# Patient Record
Sex: MALE | Race: WHITE | Employment: UNEMPLOYED | URBAN - METROPOLITAN AREA
[De-identification: names, ages, dates, MRNs, and addresses within clinical notes are randomized per-mention and may not be internally consistent; named-entity substitution may affect disease eponyms.]

---

## 2018-06-28 ENCOUNTER — OFFICE VISIT (OUTPATIENT)
Dept: FAMILY MEDICINE | Facility: CLINIC | Age: 63
End: 2018-06-28
Payer: COMMERCIAL

## 2018-06-28 VITALS
OXYGEN SATURATION: 96 % | TEMPERATURE: 98.2 F | DIASTOLIC BLOOD PRESSURE: 82 MMHG | HEART RATE: 72 BPM | SYSTOLIC BLOOD PRESSURE: 132 MMHG | WEIGHT: 243 LBS | HEIGHT: 70 IN | BODY MASS INDEX: 34.79 KG/M2

## 2018-06-28 DIAGNOSIS — E66.01 MORBID OBESITY (H): ICD-10-CM

## 2018-06-28 DIAGNOSIS — H35.62 RETINAL HEMORRHAGE, LEFT EYE: ICD-10-CM

## 2018-06-28 DIAGNOSIS — H10.022 PINK EYE DISEASE OF LEFT EYE: Primary | ICD-10-CM

## 2018-06-28 DIAGNOSIS — E78.5 HYPERLIPIDEMIA LDL GOAL <100: ICD-10-CM

## 2018-06-28 DIAGNOSIS — Z86.0100 HISTORY OF COLONIC POLYPS: ICD-10-CM

## 2018-06-28 DIAGNOSIS — I10 HYPERTENSION GOAL BP (BLOOD PRESSURE) < 130/80: ICD-10-CM

## 2018-06-28 PROCEDURE — 99203 OFFICE O/P NEW LOW 30 MIN: CPT | Performed by: FAMILY MEDICINE

## 2018-06-28 RX ORDER — GENTAMICIN SULFATE 3 MG/ML
1 SOLUTION/ DROPS OPHTHALMIC EVERY 4 HOURS
Qty: 5 ML | Refills: 0 | Status: SHIPPED | OUTPATIENT
Start: 2018-06-28 | End: 2018-07-05

## 2018-06-28 RX ORDER — ATORVASTATIN CALCIUM 20 MG/1
20 TABLET, FILM COATED ORAL DAILY
Refills: 0 | COMMUNITY
Start: 2018-06-05 | End: 2018-09-11

## 2018-06-28 NOTE — PROGRESS NOTES
"  SUBJECTIVE:                                                    Hector Chan is a 63 year old male who presents to clinic today for the following health issues:    Acute Illness   Acute illness concerns: Eye issue  Onset: 10 days ago thought he had something in left eye, 2 days ago redness developed    Fever: no    Chills/Sweats: no    Headache (location?): no    Sinus Pressure:no    Conjunctivitis:  YES- left eye red- no pain or itchy    Ear Pain: no    Rhinorrhea: no    Congestion: no    Sore Throat: no    Eyes have some increased mattering over the last 2 days     Cough: no    Wheeze: no    Decreased Appetite: no    Nausea: no    Vomiting: no    Diarrhea:  no    Dysuria/Freq.: no    Fatigue/Achiness: no    Sick/Strep Exposure: no       Therapies Tried and outcome: nothing- pt does have allergies for which he takes Zyrtec    Blurry vision- Blurry vision the last 2 weeks. Does not know if it has to do with the red eyes Pt did check BP and blood sugar and both were fine.    Hemorrhage in eye- Ophthalmologist noted a hemorrhage in left eye about 10 days ago that did cause some vision loss.     Problem list and histories reviewed & adjusted, as indicated.  Additional history: as documented    ROS:  Constitutional, HEENT, cardiovascular, pulmonary, GI, , musculoskeletal, neuro, skin, endocrine and psych systems are negative, except as otherwise noted.    This document serves as a record of the services and decisions personally performed and made by Shashank Marsh MD. It was created on her behalf by Manuela Garay, a trained medical scribe. The creation of this document is based the provider's statements to the medical scribe.  Clemente Garay 11:48 AM, June 28, 2018    OBJECTIVE:                                                    /82  Pulse 72  Temp 98.2  F (36.8  C) (Oral)  Ht 5' 9.5\" (1.765 m)  Wt 243 lb (110.2 kg)  SpO2 96%  BMI 35.37 kg/m2 Body mass index is 35.37 kg/(m^2).     GENERAL: " "healthy, alert, well nourished, well hydrated, no distress  EYES: Injection of conjunctiva and lateral part of left eye with slight mattering and minimal discharge, Eyes grossly normal to inspection, extraocular movements - intact, and PERRLNECK: no tenderness, no adenopathy, no asymmetry, no masses, no stiffness; thyroid- normal to palpation  RESP: lungs clear to auscultation - no rales, no rhonchi, no wheezes  CV: regular rates and rhythm, normal S1 S2, no S3 or S4 and no murmur, no click or rub -  MS: extremities- no gross deformities noted, no edema  SKIN: no suspicious lesions, no rashes    Diagnostic test results:  none      ASSESSMENT/PLAN:         Hector was seen today for eye problem.    Diagnoses and all orders for this visit:    Pink eye disease of left eye  Discussed possibility of allergies as well. Antibiotics as prescribed and instructions given for use. Warm packs may also be used on the eye.  -     gentamicin (GARAMYCIN) 0.3 % ophthalmic solution; Apply 1 drop to eye every 4 hours for 7 days    h/o Retinal hemorrhage, left eye - 2008  Noted by ophthalmologist. Caused some loss of vision. No recent problems.    Hypertension goal BP (blood pressure) < 130/80 - controlled - continue medication.    Hyperlipidemia LDL goal <100     Morbid obesity (H)   Encouraged healthy diet and regular exercise    History of colonic polyps  Last colonoscopy on 2/3/15.     Risks, benefits and alternatives of treatments discussed. Plan agreed on.      Followup: Return if symptoms worsen or fail to improve.    See patient instructions.     Tobacco Cessation:   reports that he has never smoked. He has never used smokeless tobacco.    BMI:   Estimated body mass index is 35.37 kg/(m^2) as calculated from the following:    Height as of this encounter: 5' 9.5\" (1.765 m).    Weight as of this encounter: 243 lb (110.2 kg).   Weight management plan: Discussed healthy diet and exercise guidelines and patient will follow up in 6 " months in clinic to re-evaluate.     The information in this document, created by the medical scribe for me, accurately reflects the services I personally performed and the decisions made by me. I have reviewed and approved this document for accuracy prior to leaving the patient care area.  June 28, 2018 12:22 PM          Bubba Marsh MD   Pager: [phone]

## 2018-06-28 NOTE — MR AVS SNAPSHOT
After Visit Summary   6/28/2018    Hector Chan    MRN: [id number]           Patient Information     Date Of Birth          1955        Visit Information        Provider Department      6/28/2018 11:20 AM Shashank Marsh MD [facility] [place] [facility]        Today's Diagnoses     Pink eye disease of left eye    -  1    Hyperlipidemia LDL goal <100        h/o Retinal hemorrhage, left eye - 2008        Hypertension goal BP (blood pressure) < 130/80        Morbid obesity (H)        History of colonic polyps          Care Instructions                    Conjunctivitis  What is eye inflammation?   The clear membrane that lines the inside of the eyelids and covers the white of the eye (conjunctiva) can get red and swollen. This is called conjunctivitis.   How does it occur?   Conjunctivitis can be caused by many things, including infection by viruses or bacteria. Many kinds of bacteria can cause conjunctivitis. These include bacteria that cause strep, staph, and STD infections.   Conjunctivitis caused by a virus is sometimes called pink eye. It can be spread easily to other people. The same viruses that cause the common cold can cause viral conjunctivitis. Viruses can be spread by coughing or sneezing and can get in your eyes through contact with infected:  hands   washcloths or towels   cosmetics   false eyelashes   soft contact lenses  Avoid unnecessary contact with others so that you do not spread the disease.   What are the symptoms?   Symptoms may include:  itchy or scratchy eyes   redness   painful sensitivity to light   swelling of eyelids   matting of eyelashes   watery or pus discharge  How is it diagnosed?   Your healthcare provider will ask about your medical history and if you have been near someone who has conjunctivitis. Your provider will examine your eyes. He or she will also check for enlarged lymph nodes near your ear and jaw. Your provider may get lab tests of a sample of the pus  to see what type of germs are present.  How is it treated?   Like a cold, viral conjunctivitis will usually go away on its own without treatment. However, your healthcare provider may prescribe eyedrops to help control your symptoms. Antihistamine pills may also relieve the itching and redness.  If you have bacterial conjunctivitis, your healthcare provider will prescribe antibiotic eyedrops. You can also help your eyes get better by washing them gently to remove any pus or crusts. Then dry them gently with a clean towel.  For very severe forms of conjunctivitis, antibiotics may need to be given by mouth or with a shot or an IV.  If you wear contact lenses, you will need to stop wearing them until your eyes are healed. The combination of contacts and conjunctivitis may damage your cornea (the clear outer layer on the front of your eye) and cause severe vision problems. Your provider may ask you to throw away your current contact lenses and lens case.  How long will the effects last?   Viral conjunctivitis usually gets worse 5 to 7 days after the first symptoms. It can get better in 10 days to 1 month. If only one eye is affected at first, the other eye may become infected up to 2 weeks later. Usually, if both eyes are affected, the first eye has worse conjunctivitis than the second.  Bacterial conjunctivitis should improve within 2 days after you begin using antibiotics. If your eyes are not better after 3 days of antibiotics, call your healthcare provider.  How can I prevent conjunctivitis?   To keep from getting conjunctivitis from someone who has it, or to keep from spreading it to others, follow these guidelines:  Wash your hands often. Do not touch or rub your eyes.   Never share eye makeup or cosmetics with anyone. When you have conjunctivitis, throw out eye makeup you have been using.   Never use eye medicine that has been prescribed for someone else.   Do not share towels, washcloths, pillows, or sheets with  anyone. If one of your eyes is affected but not the other, use a separate towel for each eye.   Avoid swimming in swimming pools if you have conjunctivitis.   Avoid close contact with people until your symptoms improve. Depending on your job, you may be asked to take some time off from work.  When should I call my healthcare provider?   Call your provider if:  You have any severe eye pain.   Your symptoms do not improve after you have used your medicine for 3 days (if you have bacterial conjunctivitis).   Your symptoms do not improve after 2 weeks (if you have viral conjunctivitis).   Your eyes get very sensitive to light, even after the redness is gone.  Reviewed for medical accuracy by faculty at the [facility]. Web site: [email or web address]            Follow-ups after your visit        Follow-up notes from your care team     Return if symptoms worsen or fail to improve.      Who to contact     If you have questions or need follow up information about today's clinic visit or your schedule please contact [facility] directly at [phone].  Normal or non-critical lab and imaging results will be communicated to you by SpaceCurvehart, letter or phone within 4 business days after the clinic has received the results. If you do not hear from us within 7 days, please contact the clinic through [facility] or phone. If you have a critical or abnormal lab result, we will notify you by phone as soon as possible.  Submit refill requests through creads or call your pharmacy and they will forward the refill request to us. Please allow 3 business days for your refill to be completed.          Additional Information About Your Visit        SpaceCurveharKoinify Information     creads gives you secure access to your electronic health record. If you see a primary care provider, you can also send messages to your care team and make appointments. If you have questions, please call your  "primary care clinic.  If you do not have a primary care provider, please call [phone] and they will assist you.        Care EveryWhere ID     This is your Care EveryWhere ID. This could be used by other organizations to access your [place] medical records  [id number]        Your Vitals Were     Pulse Temperature Height Pulse Oximetry BMI (Body Mass Index)       72 98.2  F (36.8  C) (Oral) 5' 9.5\" (1.765 m) 96% 35.37 kg/m2        Blood Pressure from Last 3 Encounters:   06/28/18 132/82   02/03/15 122/69   01/12/15 140/70    Weight from Last 3 Encounters:   06/28/18 243 lb (110.2 kg)   01/12/15 236 lb 11.2 oz (107.4 kg)              Today, you had the following     No orders found for display         Today's Medication Changes          These changes are accurate as of 6/28/18 12:03 PM.  If you have any questions, ask your nurse or doctor.               Start taking these medicines.        Dose/Directions    gentamicin 0.3 % ophthalmic solution   Commonly known as:  GARAMYCIN   Used for:  Pink eye disease of left eye   Started by:  Shashank Marsh MD        Dose:  1 drop   Apply 1 drop to eye every 4 hours for 7 days   Quantity:  5 mL   Refills:  0            Where to get your medicines      These medications were sent to [facility] [address] [place] [address] AT [facility] [address] [place] [address], [facility] [address]    Hours:  24-hours Phone:  [phone]     gentamicin 0.3 % ophthalmic solution                Primary Care Provider Office Phone # Fax #    Jamie Young [phone]       [address] [facility] [address]        Equal Access to Services     ANGELITO SELLERS AH: Troy Puga, julian ricks, seda mays. [facility] [phone].    ATENCIÓN: Si habla español, tiene a tripp disposición servicios gratuitos de asistencia lingüística. Llame al [phone].    We " comply with applicable federal civil rights laws and [place] laws. We do not discriminate on the basis of race, color, national origin, age, disability, sex, sexual orientation, or gender identity.            Thank you!     Thank you for choosing [facility]  for your care. Our goal is always to provide you with excellent care. Hearing back from our patients is one way we can continue to improve our services. Please take a few minutes to complete the written survey that you may receive in the mail after your visit with us. Thank you!             Your Updated Medication List - Protect others around you: Learn how to safely use, store and throw away your medicines at www.disposemymeds.org.          This list is accurate as of 6/28/18 12:03 PM.  Always use your most recent med list.                   Brand Name Dispense Instructions for use Diagnosis    aminocaproic acid 500 MG tablet    AMICAR     Take by mouth every 6 hours        ASPIRIN PO      Take 81 mg by mouth daily        atorvastatin 20 MG tablet    LIPITOR     Take 20 mg by mouth daily        gentamicin 0.3 % ophthalmic solution    GARAMYCIN    5 mL    Apply 1 drop to eye every 4 hours for 7 days    Pink eye disease of left eye       lisinopril 20 MG tablet    PRINIVIL/ZESTRIL    90 tablet    Take 1 tablet (20 mg) by mouth daily    HTN (hypertension)       Multi-vitamin Tabs tablet      Take 1 tablet by mouth daily        VITAMIN D3 PO      Take by mouth daily

## 2018-06-28 NOTE — PATIENT INSTRUCTIONS
Conjunctivitis  What is eye inflammation?   The clear membrane that lines the inside of the eyelids and covers the white of the eye (conjunctiva) can get red and swollen. This is called conjunctivitis.   How does it occur?   Conjunctivitis can be caused by many things, including infection by viruses or bacteria. Many kinds of bacteria can cause conjunctivitis. These include bacteria that cause strep, staph, and STD infections.   Conjunctivitis caused by a virus is sometimes called pink eye. It can be spread easily to other people. The same viruses that cause the common cold can cause viral conjunctivitis. Viruses can be spread by coughing or sneezing and can get in your eyes through contact with infected:  hands   washcloths or towels   cosmetics   false eyelashes   soft contact lenses  Avoid unnecessary contact with others so that you do not spread the disease.   What are the symptoms?   Symptoms may include:  itchy or scratchy eyes   redness   painful sensitivity to light   swelling of eyelids   matting of eyelashes   watery or pus discharge  How is it diagnosed?   Your healthcare provider will ask about your medical history and if you have been near someone who has conjunctivitis. Your provider will examine your eyes. He or she will also check for enlarged lymph nodes near your ear and jaw. Your provider may get lab tests of a sample of the pus to see what type of germs are present.  How is it treated?   Like a cold, viral conjunctivitis will usually go away on its own without treatment. However, your healthcare provider may prescribe eyedrops to help control your symptoms. Antihistamine pills may also relieve the itching and redness.  If you have bacterial conjunctivitis, your healthcare provider will prescribe antibiotic eyedrops. You can also help your eyes get better by washing them gently to remove any pus or crusts. Then dry them gently with a clean towel.  For very severe forms of  conjunctivitis, antibiotics may need to be given by mouth or with a shot or an IV.  If you wear contact lenses, you will need to stop wearing them until your eyes are healed. The combination of contacts and conjunctivitis may damage your cornea (the clear outer layer on the front of your eye) and cause severe vision problems. Your provider may ask you to throw away your current contact lenses and lens case.  How long will the effects last?   Viral conjunctivitis usually gets worse 5 to 7 days after the first symptoms. It can get better in 10 days to 1 month. If only one eye is affected at first, the other eye may become infected up to 2 weeks later. Usually, if both eyes are affected, the first eye has worse conjunctivitis than the second.  Bacterial conjunctivitis should improve within 2 days after you begin using antibiotics. If your eyes are not better after 3 days of antibiotics, call your healthcare provider.  How can I prevent conjunctivitis?   To keep from getting conjunctivitis from someone who has it, or to keep from spreading it to others, follow these guidelines:  Wash your hands often. Do not touch or rub your eyes.   Never share eye makeup or cosmetics with anyone. When you have conjunctivitis, throw out eye makeup you have been using.   Never use eye medicine that has been prescribed for someone else.   Do not share towels, washcloths, pillows, or sheets with anyone. If one of your eyes is affected but not the other, use a separate towel for each eye.   Avoid swimming in swimming pools if you have conjunctivitis.   Avoid close contact with people until your symptoms improve. Depending on your job, you may be asked to take some time off from work.  When should I call my healthcare provider?   Call your provider if:  You have any severe eye pain.   Your symptoms do not improve after you have used your medicine for 3 days (if you have bacterial conjunctivitis).   Your symptoms do not improve after 2 weeks  (if you have viral conjunctivitis).   Your eyes get very sensitive to light, even after the redness is gone.  Reviewed for medical accuracy by faculty at the [facility] at [facility]. Web site: [email or web address]

## 2018-08-03 ENCOUNTER — OFFICE VISIT (OUTPATIENT)
Dept: FAMILY MEDICINE | Facility: CLINIC | Age: 63
End: 2018-08-03
Payer: COMMERCIAL

## 2018-08-03 VITALS
WEIGHT: 250 LBS | HEART RATE: 75 BPM | TEMPERATURE: 98.1 F | DIASTOLIC BLOOD PRESSURE: 80 MMHG | HEIGHT: 69 IN | BODY MASS INDEX: 37.03 KG/M2 | OXYGEN SATURATION: 97 % | SYSTOLIC BLOOD PRESSURE: 144 MMHG

## 2018-08-03 DIAGNOSIS — I10 HYPERTENSION GOAL BP (BLOOD PRESSURE) < 130/80: ICD-10-CM

## 2018-08-03 DIAGNOSIS — H61.21 IMPACTED CERUMEN OF RIGHT EAR: Primary | ICD-10-CM

## 2018-08-03 PROCEDURE — 69209 REMOVE IMPACTED EAR WAX UNI: CPT | Mod: RT | Performed by: FAMILY MEDICINE

## 2018-08-03 PROCEDURE — 99213 OFFICE O/P EST LOW 20 MIN: CPT | Mod: 25 | Performed by: FAMILY MEDICINE

## 2018-08-03 RX ORDER — LISINOPRIL 30 MG/1
30 TABLET ORAL DAILY
Refills: 2 | COMMUNITY
Start: 2018-08-01 | End: 2018-09-11

## 2018-08-03 NOTE — PROGRESS NOTES
"  SUBJECTIVE:                                                    Hector Chan is a 63 year old male who presents to clinic today for the following health issues:    Acute Illness   Acute illness concerns: Ear plugged  Onset: 10 days    Fever: no    Chills/Sweats: no    Headache (location?): no    Sinus Pressure:no    Conjunctivitis:  no    Ear Pain: YES- plugged and inflamed right    Rhinorrhea: no    Congestion: no    Sore Throat: no     Cough: no    Wheeze: no    Decreased Appetite: no    Nausea: no    Vomiting: no    Diarrhea:  no    Dysuria/Freq.: no    Fatigue/Achiness: no    Sick/Strep Exposure: no     Therapies Tried and outcome: ear drops- hydrogen peroxide    Ear Pain ~ 1 week: Pt states that he has been experiencing ear pain as well as feeling like its swollen. He says that it has definitely impacted his ability to hear.     Problem list and histories reviewed & adjusted, as indicated.  Additional history: as documented    ROS:   Constitutional, HEENT, cardiovascular, pulmonary, GI, , musculoskeletal, neuro, skin, endocrine and psych systems are negative, except as otherwise noted.    This document serves as a record of the services and decisions personally performed and made by Shashank Marsh MD. It was created on his behalf by Aki Davis, a trained medical scribe. The creation of this document is based the provider's statements to the medical scribe.  Scribe Aki Davis 2:30 PM, August 3, 2018    OBJECTIVE:                     /80  Pulse 75  Temp 98.1  F (36.7  C) (Oral)  Ht 1.74 m (5' 8.5\")  Wt 113.4 kg (250 lb)  SpO2 97%  BMI 37.46 kg/m2  GENERAL: no apparent distress  EYES: Conjunctiva are not injected, no discharge.  EARS: Left TM -no erythema, no effusion,  not bulged.               Right occluded by wax. Irrigated by MA. Normal now. TM -no erythema, no effusion,  not bulged.  THROAT: no erythema, no exudate, no lesions  NECK: supple, no adenopathy.  RESP: clear, no wheezing, " no rales, no rhonchi  SKIN: No rashes    Diagnostic test results:  none     ASSESSMENT/PLAN:                       Hector was seen today for otalgia.    Diagnoses and all orders for this visit:    Impacted cerumen of right ear - Irrigated by MA in clinic. Issue has resolved.     Hypertension goal BP (blood pressure) < 130/80 - Controlled, continue medication.     Symptomatic cares and fever control(if indicated) discussed.  Risks and benefits of meds discussed.    See patient instruction.    The information in this document, created by the medical scribe for me, accurately reflects the services I personally performed and the decisions made by me. I have reviewed and approved this document for accuracy prior to leaving the patient care area.  2:30 PM, 08/03/18        Bubba Marsh MD

## 2018-08-03 NOTE — MR AVS SNAPSHOT
After Visit Summary   8/3/2018    Hector Chan    MRN: [id number]           Patient Information     Date Of Birth          1955        Visit Information        Provider Department      8/3/2018 2:20 PM Shashank Marsh MD [facility]        Today's Diagnoses     Impacted cerumen of right ear    -  1    Hypertension goal BP (blood pressure) < 130/80           Follow-ups after your visit        Follow-up notes from your care team     Return in about 4 weeks (around 8/31/2018) for Wellness Visit & Fasting Labs.      Your next 10 appointments already scheduled     Sep 11, 2018  8:20 AM CDT   Office Visit with Shashank Marsh MD   [facility]    [address] [place] [id number] [phone]           Bring a current list of meds and any records pertaining to this visit. For Physicals, please bring immunization records and any forms needing to be filled out. Please arrive 10 minutes early to complete paperwork.              Who to contact     If you have questions or need follow up information about today's clinic visit or your schedule please contact [facility] directly at [phone].  Normal or non-critical lab and imaging results will be communicated to you by MyChart, letter or phone within 4 business days after the clinic has received the results. If you do not hear from us within 7 days, please contact the clinic through OPTIMIZERxhart or phone. If you have a critical or abnormal lab result, we will notify you by phone as soon as possible.  Submit refill requests through [employer] or call your pharmacy and they will forward the refill request to us. Please allow 3 business days for your refill to be completed.          Additional Information About Your Visit        MyChart Information     [employer] gives you secure access to your electronic health record. If you see a primary care provider, you can  "also send messages to your care team and make appointments. If you have questions, please call your primary care clinic.  If you do not have a primary care provider, please call [phone] and they will assist you.        Care EveryWhere ID     This is your Care EveryWhere ID. This could be used by other organizations to access your [place] medical records  [id number]        Your Vitals Were     Pulse Temperature Height Pulse Oximetry BMI (Body Mass Index)       75 98.1  F (36.7  C) (Oral) 5' 8.5\" (1.74 m) 97% 37.46 kg/m2        Blood Pressure from Last 3 Encounters:   08/03/18 144/80   06/28/18 132/82   02/03/15 122/69    Weight from Last 3 Encounters:   08/03/18 250 lb (113.4 kg)   06/28/18 243 lb (110.2 kg)   01/12/15 236 lb 11.2 oz (107.4 kg)              We Performed the Following     HC REMOVAL IMPACTED CERUMEN IRRIGATION/LVG UNILAT          Today's Medication Changes          These changes are accurate as of 8/3/18  5:31 PM.  If you have any questions, ask your nurse or doctor.               These medicines have changed or have updated prescriptions.        Dose/Directions    lisinopril 30 MG tablet   Commonly known as:  PRINIVIL,ZESTRIL   This may have changed:  Another medication with the same name was removed. Continue taking this medication, and follow the directions you see here.   Changed by:  Shashank Marsh MD        Dose:  30 mg   Take 30 mg by mouth daily   Refills:  2                Primary Care Provider Office Phone # Fax #    Shashank Marsh [phone] [address] [facility] [address]        Equal Access to Services     [facility] : Hadii matthew Puga, cashda millicent, qaybseda pugh. So [facility] [phone].    ATENCIÓN: Si habla español, tiene a tripp disposición servicios gratuitos de asistencia lingüística. Llame al [phone].    We comply with applicable federal civil rights laws and " [place] laws. We do not discriminate on the basis of race, color, national origin, age, disability, sex, sexual orientation, or gender identity.            Thank you!     Thank you for choosing [facility]  for your care. Our goal is always to provide you with excellent care. Hearing back from our patients is one way we can continue to improve our services. Please take a few minutes to complete the written survey that you may receive in the mail after your visit with us. Thank you!             Your Updated Medication List - Protect others around you: Learn how to safely use, store and throw away your medicines at www.disposemymeds.org.          This list is accurate as of 8/3/18  5:31 PM.  Always use your most recent med list.                   Brand Name Dispense Instructions for use Diagnosis    aminocaproic acid 500 MG tablet    AMICAR     Take by mouth every 6 hours        ASPIRIN PO      Take 81 mg by mouth daily        atorvastatin 20 MG tablet    LIPITOR     Take 20 mg by mouth daily        lisinopril 30 MG tablet    PRINIVIL,ZESTRIL     Take 30 mg by mouth daily        Multi-vitamin Tabs tablet      Take 1 tablet by mouth daily        VITAMIN D3 PO      Take by mouth daily

## 2018-09-11 ENCOUNTER — OFFICE VISIT (OUTPATIENT)
Dept: FAMILY MEDICINE | Facility: CLINIC | Age: 63
End: 2018-09-11
Payer: COMMERCIAL

## 2018-09-11 VITALS
HEIGHT: 69 IN | WEIGHT: 250 LBS | OXYGEN SATURATION: 98 % | DIASTOLIC BLOOD PRESSURE: 48 MMHG | BODY MASS INDEX: 37.03 KG/M2 | HEART RATE: 70 BPM | TEMPERATURE: 98.6 F | SYSTOLIC BLOOD PRESSURE: 137 MMHG

## 2018-09-11 DIAGNOSIS — I10 HYPERTENSION GOAL BP (BLOOD PRESSURE) < 130/80: ICD-10-CM

## 2018-09-11 DIAGNOSIS — E78.5 HYPERLIPIDEMIA LDL GOAL <100: ICD-10-CM

## 2018-09-11 DIAGNOSIS — E66.01 MORBID OBESITY (H): ICD-10-CM

## 2018-09-11 DIAGNOSIS — Z86.0100 HISTORY OF COLONIC POLYPS: ICD-10-CM

## 2018-09-11 DIAGNOSIS — Z11.4 SCREENING FOR HIV (HUMAN IMMUNODEFICIENCY VIRUS): ICD-10-CM

## 2018-09-11 DIAGNOSIS — Z23 ENCOUNTER FOR IMMUNIZATION: ICD-10-CM

## 2018-09-11 DIAGNOSIS — J34.89 NASAL DRYNESS: ICD-10-CM

## 2018-09-11 DIAGNOSIS — Z00.00 ROUTINE GENERAL MEDICAL EXAMINATION AT A HEALTH CARE FACILITY: Primary | ICD-10-CM

## 2018-09-11 DIAGNOSIS — M76.60 ACHILLES TENDON PAIN: ICD-10-CM

## 2018-09-11 DIAGNOSIS — M25.562 LEFT KNEE PAIN, UNSPECIFIED CHRONICITY: ICD-10-CM

## 2018-09-11 DIAGNOSIS — Z11.59 NEED FOR HEPATITIS C SCREENING TEST: ICD-10-CM

## 2018-09-11 LAB
ANION GAP SERPL CALCULATED.3IONS-SCNC: 8 MMOL/L (ref 3–14)
BUN SERPL-MCNC: 18 MG/DL (ref 7–30)
CALCIUM SERPL-MCNC: 8.1 MG/DL (ref 8.5–10.1)
CHLORIDE SERPL-SCNC: 109 MMOL/L (ref 94–109)
CHOLEST SERPL-MCNC: 98 MG/DL
CO2 SERPL-SCNC: 26 MMOL/L (ref 20–32)
CREAT SERPL-MCNC: 1.12 MG/DL (ref 0.66–1.25)
CREAT UR-MCNC: 29 MG/DL
GFR SERPL CREATININE-BSD FRML MDRD: 66 ML/MIN/1.7M2
GLUCOSE SERPL-MCNC: 95 MG/DL (ref 70–99)
HDLC SERPL-MCNC: 31 MG/DL
HIV 1+2 AB+HIV1 P24 AG SERPL QL IA: ABNORMAL
LDLC SERPL CALC-MCNC: 50 MG/DL
MICROALBUMIN UR-MCNC: <5 MG/L
MICROALBUMIN/CREAT UR: NORMAL MG/G CR (ref 0–17)
NONHDLC SERPL-MCNC: 67 MG/DL
POTASSIUM SERPL-SCNC: 4 MMOL/L (ref 3.4–5.3)
SODIUM SERPL-SCNC: 143 MMOL/L (ref 133–144)
TRIGL SERPL-MCNC: 86 MG/DL

## 2018-09-11 PROCEDURE — 87389 HIV-1 AG W/HIV-1&-2 AB AG IA: CPT | Performed by: FAMILY MEDICINE

## 2018-09-11 PROCEDURE — 90686 IIV4 VACC NO PRSV 0.5 ML IM: CPT | Performed by: FAMILY MEDICINE

## 2018-09-11 PROCEDURE — 36415 COLL VENOUS BLD VENIPUNCTURE: CPT | Performed by: FAMILY MEDICINE

## 2018-09-11 PROCEDURE — G0472 HEP C SCREEN HIGH RISK/OTHER: HCPCS | Performed by: FAMILY MEDICINE

## 2018-09-11 PROCEDURE — 82043 UR ALBUMIN QUANTITATIVE: CPT | Performed by: FAMILY MEDICINE

## 2018-09-11 PROCEDURE — 90471 IMMUNIZATION ADMIN: CPT | Performed by: FAMILY MEDICINE

## 2018-09-11 PROCEDURE — 80048 BASIC METABOLIC PNL TOTAL CA: CPT | Performed by: FAMILY MEDICINE

## 2018-09-11 PROCEDURE — 99396 PREV VISIT EST AGE 40-64: CPT | Mod: 25 | Performed by: FAMILY MEDICINE

## 2018-09-11 PROCEDURE — 80061 LIPID PANEL: CPT | Performed by: FAMILY MEDICINE

## 2018-09-11 RX ORDER — ATORVASTATIN CALCIUM 20 MG/1
20 TABLET, FILM COATED ORAL DAILY
Qty: 90 TABLET | Refills: 3 | Status: SHIPPED | OUTPATIENT
Start: 2018-09-11 | End: 2019-10-25

## 2018-09-11 RX ORDER — LISINOPRIL 40 MG/1
40 TABLET ORAL DAILY
Qty: 90 TABLET | Refills: 3 | Status: SHIPPED | OUTPATIENT
Start: 2018-09-11 | End: 2019-10-25

## 2018-09-11 RX ORDER — LISINOPRIL 30 MG/1
30 TABLET ORAL DAILY
Qty: 90 TABLET | Refills: 3 | Status: SHIPPED | OUTPATIENT
Start: 2018-09-11 | End: 2018-09-11

## 2018-09-11 NOTE — MR AVS SNAPSHOT
After Visit Summary   9/11/2018    Hector Chan    MRN: [id number]           Patient Information     Date Of Birth          1955        Visit Information        Provider Department      9/11/2018 8:20 AM Shashank Marsh MD [facility]        Today's Diagnoses     Routine general medical examination at a health care facility    -  1    Morbid obesity (H)        Hypertension goal BP (blood pressure) < 130/80        Hyperlipidemia LDL goal <100        History of colonic polyps        Screening for HIV (human immunodeficiency virus)        Need for hepatitis C screening test        Encounter for immunization        Achilles tendon pain        Left knee pain, unspecified chronicity        Nasal dryness          Care Instructions      Preventive Health Recommendations  Male Ages 50 - 64    Yearly exam:             See your health care provider every year in order to  o   Review health changes.   o   Discuss preventive care.    o   Review your medicines if your doctor has prescribed any.     Have a cholesterol test every 5 years, or more frequently if you are at risk for high cholesterol/heart disease.     Have a diabetes test (fasting glucose) every three years. If you are at risk for diabetes, you should have this test more often.     Have a colonoscopy at age 50, or have a yearly FIT test (stool test). These exams will check for colon cancer.      Talk with your health care provider about whether or not a prostate cancer screening test (PSA) is right for you.    You should be tested each year for STDs (sexually transmitted diseases), if you re at risk.     Shots: Get a flu shot each year. Get a tetanus shot every 10 years.     Nutrition:    Eat at least 5 servings of fruits and vegetables daily.     Eat whole-grain bread, whole-wheat pasta and brown rice instead of white grains and rice.     Get adequate Calcium and Vitamin D.     Lifestyle    Exercise for at least 150 minutes a  week (30 minutes a day, 5 days a week). This will help you control your weight and prevent disease.     Limit alcohol to one drink per day.     No smoking.     Wear sunscreen to prevent skin cancer.     See your dentist every six months for an exam and cleaning.     See your eye doctor every 1 to 2 years.                       Achilles Tendon Injury               What is an Achilles tendon injury?   The Achilles tendon connects the heel bone to the calf muscle of the leg. Injury to the tendon may cause it to become inflamed or torn. It lets you point your toes up and down and walk by putting your heel down first and then your toes.   Tendons, are strong bands of connective tissue that attach muscle to bone. When a tendon is acutely injured it is called a strain. Achilles tendinopathy is an injury to your Achilles tendon from overuse. Tendonitis is when a tendon is inflamed. When there are micro-tears in a tendon from repeated injury it is called tendinosis. Tendinopathy is the term for both inflammation and micro-tears. This causes pain at the back of your leg by the heel.   How does it occur?   Achilles tendinopathy can be caused by:   overuse of the Achilles tendon   tight calf muscles   tight Achilles tendons   lots of uphill running   increasing the amount or intensity of sports training, sometimes along with switching to racing flats, which are racing shoes with less heel lift   over-pronation, a problem where your feet roll inward and flatten out more than normal when you walk or run   wearing high heels at work and then switching to lower-heeled shoes for exercise   An Achilles tendon may tear during sudden activity. For example the tendon might tear when you jump or start sprinting.   What are the symptoms?   Achilles tendinopathy causes pain and may cause swelling over the Achilles tendon. The tendon is tender and may be swollen. You will have pain when you rise up on your toes and pain when you stretch the  tendon. The range of motion of your ankle may be limited.   When the tendon tears or ruptures, you may feel a pop. If there is a complete tear, you will be unable to lift your heel off the ground or point your toes.   How is it diagnosed?   Your healthcare provider will examine your leg, looking for tenderness and swelling. Your provider will watch your feet when you walk or run to see if you over-pronate.   How is it treated?   To treat this condition:   Put an ice pack, gel pack, or package of frozen vegetables, wrapped in a cloth on the area every 3 to 4 hours, for up to 20 minutes at a time.   You could also do ice massage. To do this, first freeze water in a Styrofoam cup, then peel the top of the cup away to expose the ice. Hold the bottom of the cup and rub the ice over your tendon for 5 to 10 minutes. Do this 3 to 5 times a day for the first 2 days.   Raise your foot by putting a pillow under your lower leg when you sit or lie down.   Take an anti-inflammatory such as ibuprofen, or other medicine as directed by your provider. Nonsteroidal anti-inflammatory medicines (NSAIDs) may cause stomach bleeding and other problems. These risks increase with age. Read the label and take as directed. Unless recommended by your healthcare provider, do not take for more than 10 days.   While you are recovering from your injury, change your sport or activity to one that does not make your condition worse. For example, you may need to swim instead of run.   Follow your provider's instructions for doing exercises to help you recover.   After you recover from your acute injury, use moist heat for 10 to 15 minutes at a time before you do warm-up and stretching exercises. Do not use heat if you have swelling.   If you over-pronate, your healthcare provider may recommend shoe inserts, called orthotics, to keep your foot stable. You can buy orthotics at a pharmacy or athletic shoe store or they can be custom-made. If your  healthcare provider prescribes a heel lift insert for your shoe, wear it at least until your tendon heals and possibly longer. The lift prevents extra stretching of your Achilles tendon.   In some severe cases of Achilles tendonitis, your foot may be put in a cast for several weeks.   A tear of the tendon may require surgery. If you don't have surgery, your foot may be put in a cast for 6 to 10 weeks.   How long will the effects last?   The length of recovery depends on many factors such as your age, health, and if you have had a previous injury. Recovery time also depends on the severity of the injury. A tendon that is only mildly inflamed and has just started to hurt may improve within a few weeks. A tendon that is significantly inflamed and may have many tiny tears that has been painful for a long time may take up to a few months to improve. You need to stop doing the activities that cause pain until the tendon has healed. If you continue doing activities that cause the tendon pain, your symptoms will return and it will take longer to recover.   When can I return to my normal activities?   Everyone recovers from an injury at a different rate. Return to your activity depends on how soon your Achilles tendon recovers, not by how many days or weeks it has been since your injury has occurred. In general, the longer you have symptoms before you start treatment, the longer it will take to get better. The goal of rehabilitation is to return you to your normal activities as soon as is safely possible. If you return too soon you may worsen your injury.   You may safely return to your normal activities when, starting from the top of the list and progressing to the end, each of the following is true:   You have full range of motion in the injured leg compared to the uninjured leg.   You have full strength of the injured leg compared to the uninjured leg.   You can walk straight ahead without pain or limping.   How can I  prevent Achilles tendinopathy?   The best way to prevent Achilles tendon injury is to stretch your calf muscles and Achilles tendons before exercise. If you have tight Achilles tendons or calf muscles, stretch them twice a day whether or not you are doing any sports activities that day.   If you have a tendency to get Achilles tendinopathy, try to avoid running uphill.     Published by [employer].  This content is reviewed periodically and is subject to change as new health information becomes available. The information is intended to inform and educate and is not a replacement for medical evaluation, advice, diagnosis or treatment by a healthcare professional.   Written by Solomon Danielle MD, for [employer].   ? 2010 [facility] and/or its affiliates. All Rights Reserved.   Copyright   [employer] 2011  Adult Health Advisor    Achilles Tendon Injury Rehabilitation Exercises               You can do the towel stretch right away. When the towel stretch is easy, try the standing calf stretch, soleus stretch, and leg lift. When you no longer have sharp pain in your calf or tendon, you can do the step-up, heel raises, and static and dynamic balance exercises.   Towel stretch: Sit on a hard surface with your injured leg stretched out in front of you. Loop a towel around your toes and the ball of your foot and pull the towel toward your body keeping your leg straight. Hold this position for 15 to 30 seconds and then relax. Repeat 3 times.   Standing calf stretch: Stand facing a wall with your hands on the wall at about eye level. Keep your injured leg back with your heel on the floor. Keep the other leg forward with the knee bent. Turn your back foot slightly inward (as if you were pigeon-toed). Slowly lean into the wall until you feel a stretch in the back of your calf. Hold the stretch for 15 to 30 seconds. Return to the starting position. Repeat 3 times. Do this exercise several times each day.    Standing soleus stretch: Stand facing a wall with your hands on the wall at about chest height. Keep your injured leg back with your heel on the floor. Keep the other leg forward with the knee bent. Turn your back foot slightly inward (as if you were pigeon-toed). Bend your back knee slightly and gently lean into the wall until you feel a stretch in the lower calf of your injured leg. Hold the stretch for 15 to 30 seconds. Return to the starting position. Repeat 3 times.   Side-lying leg lift: Lie on your uninjured side. Tighten the front thigh muscles on your injured leg and lift that leg 8 to 10 inches away from the other leg. Keep the leg straight and lower it slowly. Do 3 sets of 10.   Step-up: Stand with the foot of your injured leg on a support 3 to 5 inches high (like a small step or block of wood). Keep your other foot flat on the floor. Shift your weight onto the injured leg on the support. Straighten your injured leg as the other leg comes off the floor. Return to the starting position by bending your injured leg and slowly lowering your uninjured leg back to the floor. Do 3 sets of 10.   Heel raise: Balance yourself while standing behind a chair or counter. Using the chair or counter as a support to help you, raise your body up onto your toes and hold for 5 seconds. Then slowly lower yourself down without holding onto the support. (It's OK to keep holding onto the support if you need to.) When this exercise becomes less painful, try lowering yourself down on the injured leg only. Repeat 10 times. Do 3 sets of 10. Rest 30 seconds between sets.   Balance and reach exercises Stand next to a chair with your injured leg further from the chair. The chair will provide support if you need it. Stand on just the foot of your injured leg. Try to raise the arch of this foot while keeping your big toe on the floor.   0. Keep your foot in this position and with the hand that is further away from the chair, reach  forward in front of you. Allow the knee on your injured side to bend. Repeat this 10 times while keeping the arch height. To make the exercise more challenging, reach farther in front of you. Do 2 sets of 10.   0.  the same position as above. While keeping your arch height, reach the hand that is further away from the chair across your body toward the chair. The farther you reach, the more challenging the exercise. Do 2 sets of 10.   Published by [employer].  This content is reviewed periodically and is subject to change as new health information becomes available. The information is intended to inform and educate and is not a replacement for medical evaluation, advice, diagnosis or treatment by a healthcare professional.   Written by Kimmie Martinez, MS, PT, and Sheila Rodriguez PT, [facility], [facility], for [facility].                         Follow-ups after your visit        Who to contact     If you have questions or need follow up information about today's clinic visit or your schedule please contact [facility] directly at [phone].  Normal or non-critical lab and imaging results will be communicated to you by [email or web address], letter or phone within 4 business days after the clinic has received the results. If you do not hear from us within 7 days, please contact the clinic through [employer] or phone. If you have a critical or abnormal lab result, we will notify you by phone as soon as possible.  Submit refill requests through [employer] or call your pharmacy and they will forward the refill request to us. Please allow 3 business days for your refill to be completed.          Additional Information About Your Visit        [employer] Information     [employer] gives you secure access to your electronic health record. If you see a primary care provider, you can also send messages to your care team and make appointments. If you have questions, please call your primary care clinic.  If you do not have a primary care  "provider, please call [phone] and they will assist you.        Care EveryWhere ID     This is your Care EveryWhere ID. This could be used by other organizations to access your [place] medical records  [id number]        Your Vitals Were     Pulse Temperature Height Pulse Oximetry BMI (Body Mass Index)       70 98.6  F (37  C) (Oral) 5' 8.5\" (1.74 m) 98% 37.46 kg/m2        Blood Pressure from Last 3 Encounters:   09/11/18 130/90   08/03/18 144/80   06/28/18 132/82    Weight from Last 3 Encounters:   09/11/18 250 lb (113.4 kg)   08/03/18 250 lb (113.4 kg)   06/28/18 243 lb (110.2 kg)              We Performed the Following     ADMIN 1st VACCINE     Albumin Random Urine Quantitative with Creat Ratio     BASIC METABOLIC PANEL     HC FLU VAC PRESRV FREE QUAD SPLIT VIR 3+YRS IM     Hepatitis C Screen Reflex to HCV RNA Quant and Genotype     HIV Screening     Lipid panel reflex to direct LDL Fasting          Today's Medication Changes          These changes are accurate as of 9/11/18  8:59 AM.  If you have any questions, ask your nurse or doctor.               Start taking these medicines.        Dose/Directions    lisinopril 40 MG tablet   Commonly known as:  PRINIVIL/ZESTRIL   Used for:  Hypertension goal BP (blood pressure) < 130/80   Started by:  Shashank Marsh MD        Dose:  40 mg   Take 1 tablet (40 mg) by mouth daily   Quantity:  90 tablet   Refills:  3            Where to get your medicines      These medications were sent to [facility] [address] [place] [address] AT [facility] [address] [address]    Hours:  24-hours Phone:  [phone]     atorvastatin 20 MG tablet    lisinopril 40 MG tablet                Primary Care Provider Office Phone # Fax #    Shashank Marsh [phone]       [address] [place] [address]        Equal Access to Services     ANGELITO SELLERS AH: Hadii julian Gomez " marlena ricksmawilma davisjudysamantha dillondaryl tonyalec min. So [facility] [phone].    ATENCIÓN: Si judy green, tiene a tripp disposición servicios gratuitos de asistencia lingüística. Zarina al [phone].    We comply with applicable federal civil rights laws and [place] laws. We do not discriminate on the basis of race, color, national origin, age, disability, sex, sexual orientation, or gender identity.            Thank you!     Thank you for choosing [facility]  for your care. Our goal is always to provide you with excellent care. Hearing back from our patients is one way we can continue to improve our services. Please take a few minutes to complete the written survey that you may receive in the mail after your visit with us. Thank you!             Your Updated Medication List - Protect others around you: Learn how to safely use, store and throw away your medicines at www.disposemymeds.org.          This list is accurate as of 9/11/18  8:59 AM.  Always use your most recent med list.                   Brand Name Dispense Instructions for use Diagnosis    ASPIRIN PO      Take 81 mg by mouth daily        atorvastatin 20 MG tablet    LIPITOR    90 tablet    Take 1 tablet (20 mg) by mouth daily    Hyperlipidemia LDL goal <100       lisinopril 40 MG tablet    PRINIVIL/ZESTRIL    90 tablet    Take 1 tablet (40 mg) by mouth daily    Hypertension goal BP (blood pressure) < 130/80       Multi-vitamin Tabs tablet      Take 1 tablet by mouth daily        VITAMIN D3 PO      Take by mouth daily

## 2018-09-11 NOTE — PATIENT INSTRUCTIONS
Preventive Health Recommendations  Male Ages 50 - 64    Yearly exam:             See your health care provider every year in order to  o   Review health changes.   o   Discuss preventive care.    o   Review your medicines if your doctor has prescribed any.     Have a cholesterol test every 5 years, or more frequently if you are at risk for high cholesterol/heart disease.     Have a diabetes test (fasting glucose) every three years. If you are at risk for diabetes, you should have this test more often.     Have a colonoscopy at age 50, or have a yearly FIT test (stool test). These exams will check for colon cancer.      Talk with your health care provider about whether or not a prostate cancer screening test (PSA) is right for you.    You should be tested each year for STDs (sexually transmitted diseases), if you re at risk.     Shots: Get a flu shot each year. Get a tetanus shot every 10 years.     Nutrition:    Eat at least 5 servings of fruits and vegetables daily.     Eat whole-grain bread, whole-wheat pasta and brown rice instead of white grains and rice.     Get adequate Calcium and Vitamin D.     Lifestyle    Exercise for at least 150 minutes a week (30 minutes a day, 5 days a week). This will help you control your weight and prevent disease.     Limit alcohol to one drink per day.     No smoking.     Wear sunscreen to prevent skin cancer.     See your dentist every six months for an exam and cleaning.     See your eye doctor every 1 to 2 years.                       Achilles Tendon Injury               What is an Achilles tendon injury?   The Achilles tendon connects the heel bone to the calf muscle of the leg. Injury to the tendon may cause it to become inflamed or torn. It lets you point your toes up and down and walk by putting your heel down first and then your toes.   Tendons, are strong bands of connective tissue that attach muscle to bone. When a tendon is acutely injured it is called a strain.  Achilles tendinopathy is an injury to your Achilles tendon from overuse. Tendonitis is when a tendon is inflamed. When there are micro-tears in a tendon from repeated injury it is called tendinosis. Tendinopathy is the term for both inflammation and micro-tears. This causes pain at the back of your leg by the heel.   How does it occur?   Achilles tendinopathy can be caused by:   overuse of the Achilles tendon   tight calf muscles   tight Achilles tendons   lots of uphill running   increasing the amount or intensity of sports training, sometimes along with switching to racing flats, which are racing shoes with less heel lift   over-pronation, a problem where your feet roll inward and flatten out more than normal when you walk or run   wearing high heels at work and then switching to lower-heeled shoes for exercise   An Achilles tendon may tear during sudden activity. For example the tendon might tear when you jump or start sprinting.   What are the symptoms?   Achilles tendinopathy causes pain and may cause swelling over the Achilles tendon. The tendon is tender and may be swollen. You will have pain when you rise up on your toes and pain when you stretch the tendon. The range of motion of your ankle may be limited.   When the tendon tears or ruptures, you may feel a pop. If there is a complete tear, you will be unable to lift your heel off the ground or point your toes.   How is it diagnosed?   Your healthcare provider will examine your leg, looking for tenderness and swelling. Your provider will watch your feet when you walk or run to see if you over-pronate.   How is it treated?   To treat this condition:   Put an ice pack, gel pack, or package of frozen vegetables, wrapped in a cloth on the area every 3 to 4 hours, for up to 20 minutes at a time.   You could also do ice massage. To do this, first freeze water in a Styrofoam cup, then peel the top of the cup away to expose the ice. Hold the bottom of the cup and  rub the ice over your tendon for 5 to 10 minutes. Do this 3 to 5 times a day for the first 2 days.   Raise your foot by putting a pillow under your lower leg when you sit or lie down.   Take an anti-inflammatory such as ibuprofen, or other medicine as directed by your provider. Nonsteroidal anti-inflammatory medicines (NSAIDs) may cause stomach bleeding and other problems. These risks increase with age. Read the label and take as directed. Unless recommended by your healthcare provider, do not take for more than 10 days.   While you are recovering from your injury, change your sport or activity to one that does not make your condition worse. For example, you may need to swim instead of run.   Follow your provider's instructions for doing exercises to help you recover.   After you recover from your acute injury, use moist heat for 10 to 15 minutes at a time before you do warm-up and stretching exercises. Do not use heat if you have swelling.   If you over-pronate, your healthcare provider may recommend shoe inserts, called orthotics, to keep your foot stable. You can buy orthotics at a pharmacy or athletic shoe store or they can be custom-made. If your healthcare provider prescribes a heel lift insert for your shoe, wear it at least until your tendon heals and possibly longer. The lift prevents extra stretching of your Achilles tendon.   In some severe cases of Achilles tendonitis, your foot may be put in a cast for several weeks.   A tear of the tendon may require surgery. If you don't have surgery, your foot may be put in a cast for 6 to 10 weeks.   How long will the effects last?   The length of recovery depends on many factors such as your age, health, and if you have had a previous injury. Recovery time also depends on the severity of the injury. A tendon that is only mildly inflamed and has just started to hurt may improve within a few weeks. A tendon that is significantly inflamed and may have many tiny tears  that has been painful for a long time may take up to a few months to improve. You need to stop doing the activities that cause pain until the tendon has healed. If you continue doing activities that cause the tendon pain, your symptoms will return and it will take longer to recover.   When can I return to my normal activities?   Everyone recovers from an injury at a different rate. Return to your activity depends on how soon your Achilles tendon recovers, not by how many days or weeks it has been since your injury has occurred. In general, the longer you have symptoms before you start treatment, the longer it will take to get better. The goal of rehabilitation is to return you to your normal activities as soon as is safely possible. If you return too soon you may worsen your injury.   You may safely return to your normal activities when, starting from the top of the list and progressing to the end, each of the following is true:   You have full range of motion in the injured leg compared to the uninjured leg.   You have full strength of the injured leg compared to the uninjured leg.   You can walk straight ahead without pain or limping.   How can I prevent Achilles tendinopathy?   The best way to prevent Achilles tendon injury is to stretch your calf muscles and Achilles tendons before exercise. If you have tight Achilles tendons or calf muscles, stretch them twice a day whether or not you are doing any sports activities that day.   If you have a tendency to get Achilles tendinopathy, try to avoid running uphill.     Published by [employer].  This content is reviewed periodically and is subject to change as new health information becomes available. The information is intended to inform and educate and is not a replacement for medical evaluation, advice, diagnosis or treatment by a healthcare professional.   Written by Solomon Danielle MD, for [employer].   ? 2010 [employer] and/or its affiliates. All Rights  Reserved.   Copyright   [employer] 2011  Adult Health Advisor    Achilles Tendon Injury Rehabilitation Exercises               You can do the towel stretch right away. When the towel stretch is easy, try the standing calf stretch, soleus stretch, and leg lift. When you no longer have sharp pain in your calf or tendon, you can do the step-up, heel raises, and static and dynamic balance exercises.   Towel stretch: Sit on a hard surface with your injured leg stretched out in front of you. Loop a towel around your toes and the ball of your foot and pull the towel toward your body keeping your leg straight. Hold this position for 15 to 30 seconds and then relax. Repeat 3 times.   Standing calf stretch: Stand facing a wall with your hands on the wall at about eye level. Keep your injured leg back with your heel on the floor. Keep the other leg forward with the knee bent. Turn your back foot slightly inward (as if you were pigeon-toed). Slowly lean into the wall until you feel a stretch in the back of your calf. Hold the stretch for 15 to 30 seconds. Return to the starting position. Repeat 3 times. Do this exercise several times each day.   Standing soleus stretch: Stand facing a wall with your hands on the wall at about chest height. Keep your injured leg back with your heel on the floor. Keep the other leg forward with the knee bent. Turn your back foot slightly inward (as if you were pigeon-toed). Bend your back knee slightly and gently lean into the wall until you feel a stretch in the lower calf of your injured leg. Hold the stretch for 15 to 30 seconds. Return to the starting position. Repeat 3 times.   Side-lying leg lift: Lie on your uninjured side. Tighten the front thigh muscles on your injured leg and lift that leg 8 to 10 inches away from the other leg. Keep the leg straight and lower it slowly. Do 3 sets of 10.   Step-up: Stand with the foot of your injured leg on a support 3 to 5 inches high  (like a small step or block of wood). Keep your other foot flat on the floor. Shift your weight onto the injured leg on the support. Straighten your injured leg as the other leg comes off the floor. Return to the starting position by bending your injured leg and slowly lowering your uninjured leg back to the floor. Do 3 sets of 10.   Heel raise: Balance yourself while standing behind a chair or counter. Using the chair or counter as a support to help you, raise your body up onto your toes and hold for 5 seconds. Then slowly lower yourself down without holding onto the support. (It's OK to keep holding onto the support if you need to.) When this exercise becomes less painful, try lowering yourself down on the injured leg only. Repeat 10 times. Do 3 sets of 10. Rest 30 seconds between sets.   Balance and reach exercises Stand next to a chair with your injured leg further from the chair. The chair will provide support if you need it. Stand on just the foot of your injured leg. Try to raise the arch of this foot while keeping your big toe on the floor.   0. Keep your foot in this position and with the hand that is further away from the chair, reach forward in front of you. Allow the knee on your injured side to bend. Repeat this 10 times while keeping the arch height. To make the exercise more challenging, reach farther in front of you. Do 2 sets of 10.   0.  the same position as above. While keeping your arch height, reach the hand that is further away from the chair across your body toward the chair. The farther you reach, the more challenging the exercise. Do 2 sets of 10.   Published by [employer].  This content is reviewed periodically and is subject to change as new health information becomes available. The information is intended to inform and educate and is not a replacement for medical evaluation, advice, diagnosis or treatment by a healthcare professional.   Written by Kimmie Martinez, MS, PT, and Sheila  Jennifer PT, DHSc, OCS, for [facility].

## 2018-09-11 NOTE — PROGRESS NOTES
SUBJECTIVE:   CC: Hector Chan is an 63 year old male who presents for preventative health visit.     Healthy Habits:    Do you get at least three servings of calcium containing foods daily (dairy, green leafy vegetables, etc.)? yes    Amount of exercise or daily activities, outside of work: not lately    Problems taking medications regularly No    Medication side effects: No    Have you had an eye exam in the past two years? yes    Do you see a dentist twice per year? once a year    Do you have sleep apnea, excessive snoring or daytime drowsiness?no       Hyperlipidemia Follow-Up      Rate your low fat/cholesterol diet?: poor    Taking statin?  Yes, no muscle aches from statin    Other lipid medications/supplements?:  None          Hypertension Follow-up      Outpatient blood pressures not regular    Low Salt Diet: no added salt    Nathan presents an elevated BP reading of 137/48 today in clinic.     Dry Nose  Nathan complains of a mild irratation and dryness in his nose. He claims that allergies are the cause and has had episodes in the past. Pt states that he sneezes a lot and has had a bloody nose this week.denies runny noses. He medicates using Zyrtec - confirms bloody nose  (L: possible seasonal allergy)     Feet / Achilles / Groin Pain  problems with feet - right achilles was doing badly (pointing to his left though) - pain in the groin new onset pain. States feet problems all his life - went to PT.     Groin area has gotten better since stopping exercise  Achilles/foot has not    High arch confirmation - has purchased accommodations    kevstuff  HLD  30mg Lipitor - 40mg potential -     exercise recommended (says he does it more steadily for a month)    KNEE STUFF  3yo left knee, doesn't affect his life but notes it        Diet advisory, ENERGY management - some soda, attempting to cutting it out -     Potential dosage uppage - 40mg from 30mg    Set target to lose 10% weight loss goal     F/u 1week on new  "med    Today's PHQ-2 Score:   PHQ-2 ( 1999 Pf[employer]) 9/11/2018 1/12/2015   Q1: Little interest or pleasure in doing things 0 0   Q2: Feeling down, depressed or hopeless 0 0   PHQ-2 Score 0 0       Abuse: Current or Past(Physical, Sexual or Emotional)- No  Do you feel safe in your environment - Yes    Social History   Substance Use Topics     Smoking status: Never Smoker     Smokeless tobacco: Never Used     Alcohol use Yes      Comment: rare       If you drink alcohol do you typically have >3 drinks per day or >7 drinks per week? No                      Last PSA:   PSA   Date Value Ref Range Status   01/12/2015 0.54 0 - 4 ug/L Final       Reviewed orders with patient. Reviewed health maintenance and updated orders accordingly - Yes  Labs reviewed in EPIC    Reviewed and updated as needed this visit by clinical staff  Tobacco  Allergies  Meds  Problems  Med Hx  Surg Hx  Fam Hx  Soc Hx          Reviewed and updated as needed this visit by Provider  Allergies  Meds  Problems          ROS:  Constitutional, HEENT, cardiovascular, pulmonary, GI, , musculoskeletal, neuro, skin, endocrine and psych systems are negative, except as otherwise noted.    This document serves as a record of the services and decisions personally performed and made by Shashank Marsh MD. It was created on his behalf by Nic Colon, a trained medical scribe. The creation of this document is based the provider's statements to the medical scribe.  Scribe Nic Colon 8:33 AM, September 11, 2018    OBJECTIVE:   /48  Pulse 70  Temp 98.6  F (37  C) (Oral)  Ht 1.74 m (5' 8.5\")  Wt 113.4 kg (250 lb)  SpO2 98%  BMI 37.46 kg/m2  EXAM:  GENERAL: healthy, alert and no distress  EYES: Eyes grossly normal to inspection, PERRL and conjunctivae and sclerae normal  HENT: ear canals and TM's normal, nose and mouth without ulcers or lesions  NECK: no adenopathy, no asymmetry, masses, or scars and thyroid normal to palpation  RESP: lungs clear to " auscultation - no rales, rhonchi or wheezes  CV: regular rate and rhythm, normal S1 S2, no S3 or S4, no murmur, click or rub, no peripheral edema and peripheral pulses strong  ABDOMEN: soft, nontender, no hepatosplenomegaly, no masses and bowel sounds normal  MS: no gross musculoskeletal defects noted, no edema  SKIN: no suspicious lesions or rashes    Nose: Left nostril dryness, dry blood residue    Back: Larger dry mole - seborrheic keratosis - cyst -     MS: Tender PES anserine bursa, Tender right achilles tendon (intact)       ASSESSMENT/PLAN:   Hector was seen today for physical.    Diagnoses and all orders for this visit:    Routine general medical examination at a health care facility    Morbid obesity (H) - diet and exercise    Hypertension goal BP (blood pressure) < 130/80  -     BASIC METABOLIC PANEL  -     Albumin Random Urine Quantitative with Creat Ratio  -     Discontinue: lisinopril (PRINIVIL,ZESTRIL) 30 MG tablet; Take 1 tablet (30 mg) by mouth daily  -     lisinopril (PRINIVIL/ZESTRIL) 40 MG tablet; Take 1 tablet (40 mg) by mouth daily    Hyperlipidemia LDL goal <100  -     atorvastatin (LIPITOR) 20 MG tablet; Take 1 tablet (20 mg) by mouth daily  -     Lipid panel reflex to direct LDL Fasting    History of colonic polyps    Achilles tendon pain - stretching reviewed.     Left knee pain, unspecified chronicity    Nasal dryness - saline    Encounter for immunization  -     HC FLU VAC PRESRV FREE QUAD SPLIT VIR 3+YRS IM  -     ADMIN 1st VACCINE    Need for hepatitis C screening test  -     Hepatitis C Screen Reflex to HCV RNA Quant and Genotype    Other orders  -     Cancel: GLUCOSE        COUNSELING:  Reviewed preventive health counseling, as reflected in patient instructions       Regular exercise       Healthy diet/nutrition       Colon cancer screening    BP Readings from Last 1 Encounters:   09/11/18 137/48     Estimated body mass index is 37.46 kg/(m^2) as calculated from the following:     "Height as of this encounter: 1.74 m (5' 8.5\").    Weight as of this encounter: 113.4 kg (250 lb).    BP Screening:   Last 3 BP Readings:    BP Readings from Last 3 Encounters:   09/11/18 137/48   08/03/18 144/80   06/28/18 132/82       The following was recommended to the patient:  Re-screen within 4 weeks and recommend lifestyle modifications  Weight management plan: Discussed healthy diet and exercise guidelines and patient will follow up in 1 month in clinic to re-evaluate.     reports that he has never smoked. He has never used smokeless tobacco.      Counseling Resources:  ATP IV Guidelines  Pooled Cohorts Equation Calculator  FRAX Risk Assessment  ICSI Preventive Guidelines  Dietary Guidelines for Americans, 2010  USDA's MyPlate  ASA Prophylaxis  Lung CA Screening    The information in this document, created by the medical scribe for me, accurately reflects the services I personally performed and the decisions made by me. I have reviewed and approved this document for accuracy prior to leaving the patient care area.  8:33 AM, 09/11/18      Shashank Marsh MD  [facility] [place]  "

## 2018-09-12 LAB — HCV AB SERPL QL IA: NONREACTIVE

## 2018-09-13 NOTE — PROGRESS NOTES
Dear Nathan,    Here is a summary of your recent test results:  -Kidney function is normal (Cr, GFR), Sodium is normal, Potassium is normal, Calcium is slight low (increase intake), Glucose is normal (diabetes screening test).   -Cholesterol levels are at your goal levels.  ADVISE: Continuing your medication, a regular exercise program with at least 30 minutes of aerobic exercise 3-4 days/week ( 45 minutes 4-6 days/week if weight loss needed), and a low saturated fat,/low carbohydrate diet are helpful to maintain this.  Rechecking your fasting cholesterol panel in 12 months is recommended (Lipid w/ LDL reflex).  -Hepatitis C antibody screen test shows no signs of a previous hepatitis C infection.  -Microalbumin (urine protein) test is normal.  ADVISE: recheck annually  -No signs of HIV by lab tests.    For additional lab test information, labtestsonline.org is an excellent reference.           Thank you very much for trusting me and [facility].     Healthy regards,  Bubba Marsh MD

## 2018-09-25 ENCOUNTER — ALLIED HEALTH/NURSE VISIT (OUTPATIENT)
Dept: NURSING | Facility: CLINIC | Age: 63
End: 2018-09-25
Payer: COMMERCIAL

## 2018-09-25 VITALS — DIASTOLIC BLOOD PRESSURE: 84 MMHG | SYSTOLIC BLOOD PRESSURE: 128 MMHG

## 2018-09-25 DIAGNOSIS — Z01.30 BP CHECK: ICD-10-CM

## 2018-09-25 DIAGNOSIS — I10 HYPERTENSION GOAL BP (BLOOD PRESSURE) < 130/80: Primary | ICD-10-CM

## 2018-09-25 PROCEDURE — 99207 ZZC NO CHARGE NURSE ONLY: CPT

## 2018-09-25 NOTE — PROGRESS NOTES
Hector Chan is a 63 year old year old patient who comes in today for a Blood Pressure check because of ongoing blood pressure monitoring and Lisinopril was increased from 30 mg to 40 mg.    Patient is taking medication as prescribed  Patient is tolerating medications well.  Patient is not monitoring Blood Pressure at home.    Current complaints: none  Disposition:  patient to continue with the same medication          BERNIE Victoria, RN, N  [facility]) [phone]

## 2018-09-25 NOTE — MR AVS SNAPSHOT
After Visit Summary   9/25/2018    Hector Chan    MRN: [id number]           Patient Information     Date Of Birth          1955        Visit Information        Provider Department      9/25/2018 9:00 AM [facility]TICO[facility]UL[facility]        Today's Diagnoses     Hypertension goal BP (blood pressure) < 130/80    -  1    BP check           Follow-ups after your visit        Who to contact     If you have questions or need follow up information about today's clinic visit or your schedule please contact [facility] directly at [phone].  Normal or non-critical lab and imaging results will be communicated to you by [employer], letter or phone within 4 business days after the clinic has received the results. If you do not hear from us within 7 days, please contact the clinic through [employer] or phone. If you have a critical or abnormal lab result, we will notify you by phone as soon as possible.  Submit refill requests through NI or call your pharmacy and they will forward the refill request to us. Please allow 3 business days for your refill to be completed.          Additional Information About Your Visit        MyChart Information     NI gives you secure access to your electronic health record. If you see a primary care provider, you can also send messages to your care team and make appointments. If you have questions, please call your primary care clinic.  If you do not have a primary care provider, please call [phone] and they will assist you.        Care EveryWhere ID     This is your Care EveryWhere ID. This could be used by other organizations to access your [facility] medical records  [id number]         Blood Pressure from Last 3 Encounters:   09/25/18 128/84   09/11/18 137/48   08/03/18 144/80    Weight from Last 3 Encounters:   09/11/18 250 lb (113.4 kg)   08/03/18 250 lb (113.4 kg)   06/28/18 243 lb (110.2 kg)              Today, you  had the following     No orders found for display       Primary Care Provider Office Phone # Fax #    Shashank Marsh [phone]       [address] [place] [address]        Equal Access to Services     ANGELITO SELLERS : Hadii matthew canela jewello Soallison, waaxda luqadaha, qaybta kaalmada adeegyada, waxdaryl idiin hayaan ryan meeks jennifer min. So [facility] [phone].    ATENCIÓN: Si habla español, tiene a tripp disposición servicios gratuitos de asistencia lingüística. Llame al [phone].    We comply with applicable federal civil rights laws and [place] laws. We do not discriminate on the basis of race, color, national origin, age, disability, sex, sexual orientation, or gender identity.            Thank you!     Thank you for choosing [facility]  for your care. Our goal is always to provide you with excellent care. Hearing back from our patients is one way we can continue to improve our services. Please take a few minutes to complete the written survey that you may receive in the mail after your visit with us. Thank you!             Your Updated Medication List - Protect others around you: Learn how to safely use, store and throw away your medicines at www.disposemymeds.org.          This list is accurate as of 9/25/18  9:16 AM.  Always use your most recent med list.                   Brand Name Dispense Instructions for use Diagnosis    ASPIRIN PO      Take 81 mg by mouth daily        atorvastatin 20 MG tablet    LIPITOR    90 tablet    Take 1 tablet (20 mg) by mouth daily    Hyperlipidemia LDL goal <100       lisinopril 40 MG tablet    PRINIVIL/ZESTRIL    90 tablet    Take 1 tablet (40 mg) by mouth daily    Hypertension goal BP (blood pressure) < 130/80       Multi-vitamin Tabs tablet      Take 1 tablet by mouth daily        VITAMIN D3 PO      Take by mouth daily

## 2019-10-25 ENCOUNTER — OFFICE VISIT (OUTPATIENT)
Dept: FAMILY MEDICINE | Facility: CLINIC | Age: 64
End: 2019-10-25
Payer: COMMERCIAL

## 2019-10-25 VITALS
TEMPERATURE: 98.4 F | DIASTOLIC BLOOD PRESSURE: 87 MMHG | HEART RATE: 69 BPM | HEIGHT: 69 IN | BODY MASS INDEX: 38.21 KG/M2 | WEIGHT: 258 LBS | SYSTOLIC BLOOD PRESSURE: 135 MMHG | OXYGEN SATURATION: 98 %

## 2019-10-25 DIAGNOSIS — M25.562 PAIN IN BOTH KNEES, UNSPECIFIED CHRONICITY: ICD-10-CM

## 2019-10-25 DIAGNOSIS — R53.83 FATIGUE, UNSPECIFIED TYPE: ICD-10-CM

## 2019-10-25 DIAGNOSIS — Z12.5 SCREENING FOR PROSTATE CANCER: ICD-10-CM

## 2019-10-25 DIAGNOSIS — R06.83 SNORING: ICD-10-CM

## 2019-10-25 DIAGNOSIS — E66.01 MORBID OBESITY (H): ICD-10-CM

## 2019-10-25 DIAGNOSIS — Z23 ENCOUNTER FOR IMMUNIZATION: ICD-10-CM

## 2019-10-25 DIAGNOSIS — E78.5 HYPERLIPIDEMIA LDL GOAL <100: ICD-10-CM

## 2019-10-25 DIAGNOSIS — Z86.0100 HISTORY OF COLONIC POLYPS: ICD-10-CM

## 2019-10-25 DIAGNOSIS — I10 HYPERTENSION GOAL BP (BLOOD PRESSURE) < 130/80: ICD-10-CM

## 2019-10-25 DIAGNOSIS — M25.561 PAIN IN BOTH KNEES, UNSPECIFIED CHRONICITY: ICD-10-CM

## 2019-10-25 DIAGNOSIS — Z00.00 ROUTINE GENERAL MEDICAL EXAMINATION AT A HEALTH CARE FACILITY: Primary | ICD-10-CM

## 2019-10-25 LAB
ALBUMIN SERPL-MCNC: 3.8 G/DL (ref 3.4–5)
ALP SERPL-CCNC: 80 U/L (ref 40–150)
ALT SERPL W P-5'-P-CCNC: 31 U/L (ref 0–70)
ANION GAP SERPL CALCULATED.3IONS-SCNC: 7 MMOL/L (ref 3–14)
AST SERPL W P-5'-P-CCNC: 16 U/L (ref 0–45)
BILIRUB SERPL-MCNC: 0.4 MG/DL (ref 0.2–1.3)
BUN SERPL-MCNC: 25 MG/DL (ref 7–30)
CALCIUM SERPL-MCNC: 8.3 MG/DL (ref 8.5–10.1)
CHLORIDE SERPL-SCNC: 106 MMOL/L (ref 94–109)
CHOLEST SERPL-MCNC: 134 MG/DL
CO2 SERPL-SCNC: 25 MMOL/L (ref 20–32)
CREAT SERPL-MCNC: 1.04 MG/DL (ref 0.66–1.25)
ERYTHROCYTE [DISTWIDTH] IN BLOOD BY AUTOMATED COUNT: 13 % (ref 10–15)
GFR SERPL CREATININE-BSD FRML MDRD: 76 ML/MIN/{1.73_M2}
GLUCOSE SERPL-MCNC: 89 MG/DL (ref 70–99)
HBA1C MFR BLD: 5.7 % (ref 0–5.6)
HCT VFR BLD AUTO: 42.3 % (ref 40–53)
HDLC SERPL-MCNC: 28 MG/DL
HGB BLD-MCNC: 13.7 G/DL (ref 13.3–17.7)
LDLC SERPL CALC-MCNC: 81 MG/DL
MCH RBC QN AUTO: 29.5 PG (ref 26.5–33)
MCHC RBC AUTO-ENTMCNC: 32.4 G/DL (ref 31.5–36.5)
MCV RBC AUTO: 91 FL (ref 78–100)
NONHDLC SERPL-MCNC: 106 MG/DL
PLATELET # BLD AUTO: 228 10E9/L (ref 150–450)
POTASSIUM SERPL-SCNC: 4.3 MMOL/L (ref 3.4–5.3)
PROT SERPL-MCNC: 7 G/DL (ref 6.8–8.8)
PSA SERPL-ACNC: 0.65 UG/L (ref 0–4)
RBC # BLD AUTO: 4.65 10E12/L (ref 4.4–5.9)
SODIUM SERPL-SCNC: 138 MMOL/L (ref 133–144)
TRIGL SERPL-MCNC: 127 MG/DL
TSH SERPL DL<=0.005 MIU/L-ACNC: 1.12 MU/L (ref 0.4–4)
WBC # BLD AUTO: 11.3 10E9/L (ref 4–11)

## 2019-10-25 PROCEDURE — 99396 PREV VISIT EST AGE 40-64: CPT | Mod: 25 | Performed by: FAMILY MEDICINE

## 2019-10-25 PROCEDURE — 84443 ASSAY THYROID STIM HORMONE: CPT | Performed by: FAMILY MEDICINE

## 2019-10-25 PROCEDURE — 36415 COLL VENOUS BLD VENIPUNCTURE: CPT | Performed by: FAMILY MEDICINE

## 2019-10-25 PROCEDURE — G0103 PSA SCREENING: HCPCS | Performed by: FAMILY MEDICINE

## 2019-10-25 PROCEDURE — 90682 RIV4 VACC RECOMBINANT DNA IM: CPT | Performed by: FAMILY MEDICINE

## 2019-10-25 PROCEDURE — 82043 UR ALBUMIN QUANTITATIVE: CPT | Performed by: FAMILY MEDICINE

## 2019-10-25 PROCEDURE — 83036 HEMOGLOBIN GLYCOSYLATED A1C: CPT | Performed by: FAMILY MEDICINE

## 2019-10-25 PROCEDURE — 80061 LIPID PANEL: CPT | Performed by: FAMILY MEDICINE

## 2019-10-25 PROCEDURE — 85027 COMPLETE CBC AUTOMATED: CPT | Performed by: FAMILY MEDICINE

## 2019-10-25 PROCEDURE — 90471 IMMUNIZATION ADMIN: CPT | Performed by: FAMILY MEDICINE

## 2019-10-25 PROCEDURE — 80053 COMPREHEN METABOLIC PANEL: CPT | Performed by: FAMILY MEDICINE

## 2019-10-25 RX ORDER — ATORVASTATIN CALCIUM 20 MG/1
20 TABLET, FILM COATED ORAL DAILY
Qty: 90 TABLET | Refills: 3 | Status: SHIPPED | OUTPATIENT
Start: 2019-10-25 | End: 2020-11-08

## 2019-10-25 RX ORDER — LISINOPRIL 40 MG/1
40 TABLET ORAL DAILY
Qty: 90 TABLET | Refills: 3 | Status: SHIPPED | OUTPATIENT
Start: 2019-10-25 | End: 2020-11-08

## 2019-10-25 ASSESSMENT — ANXIETY QUESTIONNAIRES
2. NOT BEING ABLE TO STOP OR CONTROL WORRYING: SEVERAL DAYS
7. FEELING AFRAID AS IF SOMETHING AWFUL MIGHT HAPPEN: NOT AT ALL
6. BECOMING EASILY ANNOYED OR IRRITABLE: SEVERAL DAYS
5. BEING SO RESTLESS THAT IT IS HARD TO SIT STILL: NOT AT ALL
1. FEELING NERVOUS, ANXIOUS, OR ON EDGE: NOT AT ALL
GAD7 TOTAL SCORE: 4
3. WORRYING TOO MUCH ABOUT DIFFERENT THINGS: SEVERAL DAYS
IF YOU CHECKED OFF ANY PROBLEMS ON THIS QUESTIONNAIRE, HOW DIFFICULT HAVE THESE PROBLEMS MADE IT FOR YOU TO DO YOUR WORK, TAKE CARE OF THINGS AT HOME, OR GET ALONG WITH OTHER PEOPLE: NOT DIFFICULT AT ALL

## 2019-10-25 ASSESSMENT — PATIENT HEALTH QUESTIONNAIRE - PHQ9
SUM OF ALL RESPONSES TO PHQ QUESTIONS 1-9: 4
5. POOR APPETITE OR OVEREATING: SEVERAL DAYS

## 2019-10-25 ASSESSMENT — MIFFLIN-ST. JEOR: SCORE: 1947.72

## 2019-10-25 NOTE — PROGRESS NOTES
SUBJECTIVE:   CC: Hector Chan is an 64 year old male who presents for preventive health visit.     Healthy Habits:    Do you get at least three servings of calcium containing foods daily (dairy, green leafy vegetables, etc.)? sometimes    Amount of exercise or daily activities, outside of work: no much    Problems taking medications regularly No    Medication side effects: No    Have you had an eye exam in the past two years? yes    Do you see a dentist twice per year? no    Do you have sleep apnea, excessive snoring or daytime drowsiness?yes    Knee Pain  Bilateral knee pain for the last 5 months. He stood up and his right knee was in pain. There was not much swelling noticed. He went to a chiropractor for a couple months. His left knee is now having the same symptoms. He has started doing stretching and icing a couple of weeks ago which seemed to help. When he sits they get painful. Patient does not want an XR today. He has good and bad days.     Sleep Problems  He snores and his wife reports weird breathing and occasionally stop breathing. When he sleeps on the floor he does better until he gets in a certain position. He wakes up rested however.     Right Flank Pain  He drank more water than he usually does a few days ago. He gets pain there when he does not drink enough water.     Hyperlipidemia Follow-Up    Are you having any of the following symptoms? (Select all that apply)  No complaints of shortness of breath, chest pain or pressure.  No increased sweating or nausea with activity.  No left-sided neck or arm pain.  No complaints of pain in calves when walking 1-2 blocks.    Are you regularly taking any medication or supplement to lower your cholesterol?   Yes- lipitor    Are you having muscle aches or other side effects that you think could be caused by your cholesterol lowering medication?  No    Hypertension Follow-up    Do you check your blood pressure regularly outside of the clinic? Not often  "    Are you following a low salt diet? Yes    Are your blood pressures ever more than 140 on the top number (systolic) OR more   than 90 on the bottom number (diastolic), for example 140/90? No    Today's PHQ-2 Score:   PHQ-2 ( 1999 Pfizer) 10/25/2019 9/11/2018   Q1: Little interest or pleasure in doing things 0 0   Q2: Feeling down, depressed or hopeless 0 0   PHQ-2 Score 0 0     Abuse: Current or Past(Physical, Sexual or Emotional)- No  Do you feel safe in your environment? Yes    Social History     Tobacco Use     Smoking status: Never Smoker     Smokeless tobacco: Never Used   Substance Use Topics     Alcohol use: Yes     Comment: rare      If you drink alcohol do you typically have >3 drinks per day or >7 drinks per week? No                      Last PSA:   PSA   Date Value Ref Range Status   01/12/2015 0.54 0 - 4 ug/L Final     Reviewed orders with patient. Reviewed health maintenance and updated orders accordingly - Yes    Reviewed and updated as needed this visit by clinical staff  Tobacco  Allergies  Meds  Problems  Med Hx  Surg Hx  Fam Hx  Soc Hx        Reviewed and updated as needed this visit by Provider  Tobacco  Allergies  Meds  Problems  Med Hx  Surg Hx  Fam Hx      Zoster vaccine also declined by patient to check with insurance.     ROS:  Constitutional, HEENT, cardiovascular, pulmonary, GI, , musculoskeletal, neuro, skin, endocrine and psych systems are negative, except as otherwise noted.  This document serves as a record of the services and decisions personally performed and made by Shashank Marsh MD. It was created on his behalf by Regan Mead, a trained medical scribe. The creation of this document is based the provider's statements to the medical scribe.  Scribe Regan Mead 11:29 AM, October 25, 2019    OBJECTIVE:   /87   Pulse 69   Temp 98.4  F (36.9  C) (Oral)   Ht 1.74 m (5' 8.5\")   Wt 117 kg (258 lb)   SpO2 98%   BMI 38.66 kg/m    EXAM:  GENERAL: healthy, alert " and no distress  EYES: Eyes grossly normal to inspection, PERRL and conjunctivae and sclerae normal  HENT: ear canals and TM's normal, nose and mouth without ulcers or lesions  NECK: no adenopathy, no asymmetry, masses, or scars and thyroid normal to palpation  RESP: lungs clear to auscultation - no rales, rhonchi or wheezes  CV: regular rate and rhythm, normal S1 S2, no S3 or S4, no murmur, click or rub, no peripheral edema and peripheral pulses strong  ABDOMEN: soft, nontender, no hepatosplenomegaly, no masses and bowel sounds normal   (male): normal male genitalia without lesions or urethral discharge, no hernia  RECTAL: normal sphincter tone, no rectal masses, prostate normal size, smooth, nontender without nodules or masses  MS: medial joint line tenderness with palpation, pain with medial meniscus test (eCe Test?), no gross musculoskeletal defects noted, no edema  SKIN: dark rough spots on back, skin tag under left arm and above belt line otherwise, no suspicious lesions or rashes  NEURO: Normal strength and tone, mentation intact and speech normal  PSYCH: mentation appears normal, affect normal/bright  LYMPH: no cervical, supraclavicular, axillary, or inguinal adenopathy    Results for orders placed or performed in visit on 10/25/19   CBC with platelets   Result Value Ref Range    WBC 11.3 (H) 4.0 - 11.0 10e9/L    RBC Count 4.65 4.4 - 5.9 10e12/L    Hemoglobin 13.7 13.3 - 17.7 g/dL    Hematocrit 42.3 40.0 - 53.0 %    MCV 91 78 - 100 fl    MCH 29.5 26.5 - 33.0 pg    MCHC 32.4 31.5 - 36.5 g/dL    RDW 13.0 10.0 - 15.0 %    Platelet Count 228 150 - 450 10e9/L   Hemoglobin A1c   Result Value Ref Range    Hemoglobin A1C 5.7 (H) 0 - 5.6 %      ASSESSMENT/PLAN:   Hector was seen today for physical.    Diagnoses and all orders for this visit:    Routine general medical examination at a health care facility    Hyperlipidemia LDL goal <100 - controlled, continue medication. Labs ordered and pending today.   -     " Lipid panel reflex to direct LDL Fasting  -     atorvastatin (LIPITOR) 20 MG tablet; Take 1 tablet (20 mg) by mouth daily  -     Comprehensive metabolic panel    Hypertension goal BP (blood pressure) < 130/80 - fair control, continue medication. Labs ordered today.   -     Albumin Random Urine Quantitative with Creat Ratio  -     lisinopril (PRINIVIL/ZESTRIL) 40 MG tablet; Take 1 tablet (40 mg) by mouth daily  -     Comprehensive metabolic panel    Morbid obesity (H) - Recommend diet and exercise.   -     CBC with platelets  -     Hemoglobin A1c    Pain in both knees, unspecified chronicity - Patient has been getting pain in his knees bilaterally. Right knee was positive for pain during medial meniscus test (Cee's Test). Potentially a meniscus problem in right knee, more likely arthritic origin in both. Continue to stretch and ice. Weight loss may help with symptoms.     Fatigue, unspecified type - Referral given. Thyroid activity concern expressed by wife. Labs ordered and pending today.   -     TSH with free T4 reflex  -     SLEEP EVALUATION & MANAGEMENT REFERRAL - [facility]  [phone] (Age 18 and up); Future    Snoring - reported by wife. Referral given.   -     SLEEP EVALUATION & MANAGEMENT REFERRAL - [facility]  [phone] (Age 18 and up); Future    History of colonic polyps - stable.     Screening for prostate cancer  -     Prostate spec antigen screen    Encounter for immunization  -     C RIV4 (FLUBLOK) VACCINE RECOMBINANT DNA PRSRV ANTIBIO FREE, IM [69127]  -          ADMIN VACCINE, FIRST [[id number]]    COUNSELING:  Reviewed preventive health counseling, as reflected in patient instructions    BP Readings from Last 1 Encounters:   10/25/19 135/87     Estimated body mass index is 38.66 kg/m  as calculated from the following:    Height as of this encounter: 1.74 m (5' 8.5\").    Weight as of this encounter: 117 kg (258 lb).    Weight " management plan: Discussed healthy diet and exercise guidelines     reports that he has never smoked. He has never used smokeless tobacco.    Counseling Resources:  ATP IV Guidelines  Pooled Cohorts Equation Calculator  FRAX Risk Assessment  ICSI Preventive Guidelines  Dietary Guidelines for Americans, 2010  USDA's MyPlate  ASA Prophylaxis  Lung CA Screening    The information in this document, created by the medical scribe for me, accurately reflects the services I personally performed and the decisions made by me. I have reviewed and approved this document for accuracy prior to leaving the patient care area.  11:55 AM, 10/25/19    Shashank Marsh MD  [facility] [place]

## 2019-10-26 LAB
CREAT UR-MCNC: 67 MG/DL
MICROALBUMIN UR-MCNC: 6 MG/L
MICROALBUMIN/CREAT UR: 9.47 MG/G CR (ref 0–17)

## 2019-10-26 ASSESSMENT — ANXIETY QUESTIONNAIRES: GAD7 TOTAL SCORE: 4

## 2019-10-29 NOTE — RESULT ENCOUNTER NOTE
Dear Nathan,    Here is a summary of your recent test results:  -Normal red blood cell (hgb) levels, normal white blood cell count and normal platelet levels.  -PSA (prostate specific antigen) test is normal.  This indicates a low likelihood of prostate cancer.  ADVISE: rechecking this in 1 year.  -Cholesterol levels are at your goal levels.  ADVISE: continuing your medication, a regular exercise program with at least 150 minutes of aerobic exercise per week, and eating a low saturated fat/low carbohydrate diet.  Also, you should recheck this fasting cholesterol panel in 12 months.  -Liver and gallbladder tests are normal (ALT,AST, Alk phos, bilirubin), kidney function is normal (Cr, GFR), sodium is normal, potassium is normal, calcium is normal, glucose is normal.  -A1C (test of diabetes control the last 2-3 months) is at your goal. Please continue with your current plan. Also, you should make an appointment to see me and recheck your A1C test in 6 months.   -TSH (thyroid stimulating hormone) level is normal which indicates normal thyroid function.  -Microalbumin (urine protein) test is normal.  ADVISE: rechecking this annually.    For additional lab test information, labtestsonline.org is an excellent reference.           Thank you very much for trusting me and [facility].     Healthy regards,  Bubba Marsh MD

## 2019-11-04 ENCOUNTER — MYC MEDICAL ADVICE (OUTPATIENT)
Dept: FAMILY MEDICINE | Facility: CLINIC | Age: 64
End: 2019-11-04

## 2019-11-04 DIAGNOSIS — M25.562 PAIN IN BOTH KNEES, UNSPECIFIED CHRONICITY: Primary | ICD-10-CM

## 2019-11-04 DIAGNOSIS — M25.561 PAIN IN BOTH KNEES, UNSPECIFIED CHRONICITY: Primary | ICD-10-CM

## 2019-11-05 NOTE — TELEPHONE ENCOUNTER
Forwarded to RL.  Please review patient's Mychart message and advise.  Knee x-ray offered at last office visit but patient declined.    Taylor Olvera, BS, RN, PHN  [facility]) [phone]

## 2019-11-11 NOTE — TELEPHONE ENCOUNTER
Zentric message sent to patient advising orders have been placed and he should call to schedule an x-ray only appointment.      BERNIE Victoria, RN, N  [facility]  Office: [phone]  Fax: [phone]

## 2019-11-11 NOTE — TELEPHONE ENCOUNTER
Will check bilateral knee xrays and okay with a xray only appointment.  Future xrays have been ordered - please schedule an xray only appointment.

## 2020-12-03 ENCOUNTER — OFFICE VISIT (OUTPATIENT)
Dept: FAMILY MEDICINE | Facility: CLINIC | Age: 65
End: 2020-12-03
Payer: COMMERCIAL

## 2020-12-03 VITALS
HEIGHT: 69 IN | TEMPERATURE: 96.3 F | BODY MASS INDEX: 36.88 KG/M2 | SYSTOLIC BLOOD PRESSURE: 140 MMHG | OXYGEN SATURATION: 99 % | DIASTOLIC BLOOD PRESSURE: 80 MMHG | WEIGHT: 249 LBS | HEART RATE: 74 BPM

## 2020-12-03 DIAGNOSIS — R31.29 MICROHEMATURIA: ICD-10-CM

## 2020-12-03 DIAGNOSIS — Z00.00 ROUTINE GENERAL MEDICAL EXAMINATION AT A HEALTH CARE FACILITY: Primary | ICD-10-CM

## 2020-12-03 DIAGNOSIS — E66.01 MORBID OBESITY (H): ICD-10-CM

## 2020-12-03 DIAGNOSIS — Z13.0 SCREENING, DEFICIENCY ANEMIA, IRON: ICD-10-CM

## 2020-12-03 DIAGNOSIS — B35.1 ONYCHOMYCOSIS: ICD-10-CM

## 2020-12-03 DIAGNOSIS — E78.5 HYPERLIPIDEMIA LDL GOAL <100: ICD-10-CM

## 2020-12-03 DIAGNOSIS — I10 HYPERTENSION GOAL BP (BLOOD PRESSURE) < 130/80: ICD-10-CM

## 2020-12-03 DIAGNOSIS — Z23 ENCOUNTER FOR IMMUNIZATION: ICD-10-CM

## 2020-12-03 DIAGNOSIS — Z86.0100 HISTORY OF COLONIC POLYPS: ICD-10-CM

## 2020-12-03 DIAGNOSIS — Z12.5 SCREENING FOR PROSTATE CANCER: ICD-10-CM

## 2020-12-03 DIAGNOSIS — Z12.11 SCREEN FOR COLON CANCER: ICD-10-CM

## 2020-12-03 LAB
ALBUMIN UR-MCNC: NEGATIVE MG/DL
APPEARANCE UR: CLEAR
BACTERIA #/AREA URNS HPF: ABNORMAL /HPF
BILIRUB UR QL STRIP: NEGATIVE
COLOR UR AUTO: YELLOW
ERYTHROCYTE [DISTWIDTH] IN BLOOD BY AUTOMATED COUNT: 12.5 % (ref 10–15)
GLUCOSE UR STRIP-MCNC: NEGATIVE MG/DL
HCT VFR BLD AUTO: 43.7 % (ref 40–53)
HGB BLD-MCNC: 14 G/DL (ref 13.3–17.7)
HGB UR QL STRIP: ABNORMAL
KETONES UR STRIP-MCNC: NEGATIVE MG/DL
LEUKOCYTE ESTERASE UR QL STRIP: NEGATIVE
MCH RBC QN AUTO: 28.9 PG (ref 26.5–33)
MCHC RBC AUTO-ENTMCNC: 32 G/DL (ref 31.5–36.5)
MCV RBC AUTO: 90 FL (ref 78–100)
NITRATE UR QL: NEGATIVE
PH UR STRIP: 5.5 PH (ref 5–7)
PLATELET # BLD AUTO: 224 10E9/L (ref 150–450)
RBC # BLD AUTO: 4.84 10E12/L (ref 4.4–5.9)
RBC #/AREA URNS AUTO: ABNORMAL /HPF
SOURCE: ABNORMAL
SP GR UR STRIP: 1.01 (ref 1–1.03)
UROBILINOGEN UR STRIP-ACNC: 0.2 EU/DL (ref 0.2–1)
WBC # BLD AUTO: 10.4 10E9/L (ref 4–11)
WBC #/AREA URNS AUTO: ABNORMAL /HPF

## 2020-12-03 PROCEDURE — 36415 COLL VENOUS BLD VENIPUNCTURE: CPT | Performed by: FAMILY MEDICINE

## 2020-12-03 PROCEDURE — 90682 RIV4 VACC RECOMBINANT DNA IM: CPT | Performed by: FAMILY MEDICINE

## 2020-12-03 PROCEDURE — 82043 UR ALBUMIN QUANTITATIVE: CPT | Performed by: FAMILY MEDICINE

## 2020-12-03 PROCEDURE — G0103 PSA SCREENING: HCPCS | Performed by: FAMILY MEDICINE

## 2020-12-03 PROCEDURE — 85027 COMPLETE CBC AUTOMATED: CPT | Performed by: FAMILY MEDICINE

## 2020-12-03 PROCEDURE — 99396 PREV VISIT EST AGE 40-64: CPT | Mod: 25 | Performed by: FAMILY MEDICINE

## 2020-12-03 PROCEDURE — 80061 LIPID PANEL: CPT | Performed by: FAMILY MEDICINE

## 2020-12-03 PROCEDURE — 81001 URINALYSIS AUTO W/SCOPE: CPT | Performed by: FAMILY MEDICINE

## 2020-12-03 PROCEDURE — 90471 IMMUNIZATION ADMIN: CPT | Performed by: FAMILY MEDICINE

## 2020-12-03 PROCEDURE — 80048 BASIC METABOLIC PNL TOTAL CA: CPT | Performed by: FAMILY MEDICINE

## 2020-12-03 RX ORDER — LISINOPRIL 40 MG/1
40 TABLET ORAL DAILY
Qty: 90 TABLET | Refills: 3 | Status: SHIPPED | OUTPATIENT
Start: 2020-12-03 | End: 2021-12-07

## 2020-12-03 RX ORDER — ATORVASTATIN CALCIUM 20 MG/1
20 TABLET, FILM COATED ORAL DAILY
Qty: 90 TABLET | Refills: 3 | Status: SHIPPED | OUTPATIENT
Start: 2020-12-03 | End: 2021-12-07

## 2020-12-03 RX ORDER — HYDROCHLOROTHIAZIDE 12.5 MG/1
12.5 TABLET ORAL DAILY
Qty: 90 TABLET | Refills: 3 | Status: SHIPPED | OUTPATIENT
Start: 2020-12-03 | End: 2021-12-07

## 2020-12-03 RX ORDER — TERBINAFINE HYDROCHLORIDE 250 MG/1
250 TABLET ORAL DAILY
Qty: 28 TABLET | Refills: 0 | Status: SHIPPED | OUTPATIENT
Start: 2020-12-03 | End: 2021-09-07

## 2020-12-03 SDOH — ECONOMIC STABILITY: FOOD INSECURITY: WITHIN THE PAST 12 MONTHS, YOU WORRIED THAT YOUR FOOD WOULD RUN OUT BEFORE YOU GOT MONEY TO BUY MORE.: NOT ASKED

## 2020-12-03 SDOH — ECONOMIC STABILITY: INCOME INSECURITY: HOW HARD IS IT FOR YOU TO PAY FOR THE VERY BASICS LIKE FOOD, HOUSING, MEDICAL CARE, AND HEATING?: NOT ASKED

## 2020-12-03 SDOH — ECONOMIC STABILITY: TRANSPORTATION INSECURITY
IN THE PAST 12 MONTHS, HAS THE LACK OF TRANSPORTATION KEPT YOU FROM MEDICAL APPOINTMENTS OR FROM GETTING MEDICATIONS?: NOT ASKED

## 2020-12-03 SDOH — ECONOMIC STABILITY: FOOD INSECURITY: WITHIN THE PAST 12 MONTHS, THE FOOD YOU BOUGHT JUST DIDN'T LAST AND YOU DIDN'T HAVE MONEY TO GET MORE.: NOT ASKED

## 2020-12-03 SDOH — ECONOMIC STABILITY: TRANSPORTATION INSECURITY
IN THE PAST 12 MONTHS, HAS LACK OF TRANSPORTATION KEPT YOU FROM MEETINGS, WORK, OR FROM GETTING THINGS NEEDED FOR DAILY LIVING?: NOT ASKED

## 2020-12-03 ASSESSMENT — MIFFLIN-ST. JEOR: SCORE: 1901.9

## 2020-12-03 NOTE — PROGRESS NOTES
"SUBJECTIVE:   CC: Hector Chan is an 65 year old male who presents for preventive health visit.     Healthy Habits:     Getting at least 3 servings of Calcium per day:  NO    Bi-annual eye exam:  Yes    Dental care twice a year:  NO    Sleep apnea or symptoms of sleep apnea:  Daytime drowsiness, Excessive snoring and Sleep apnea    Diet:  Regular (no restrictions)    Frequency of exercise:  None    Taking medications regularly:  No    Barriers to taking medications:  Problems remembering to take them    Medication side effects:  Not applicable    PHQ-2 Total Score: 2    Additional concerns today:  Yes    Eyes with mattering laterally in the morning.  No pruritus and okay throughout the day. Some mild irritation. Vision is unchanged and fine. No contacts. No CPAP.     Toenail fungus - on all toes - some are \"deformed\" - no pain.     Right knee pains.  - not swelling - injured while stretching. Getting better      Hyperlipidemia Follow-Up      Are you regularly taking any medication or supplement to lower your cholesterol?   Yes- Lipitor    Are you having muscle aches or other side effects that you think could be caused by your cholesterol lowering medication?  No    Hypertension Follow-up      Do you check your blood pressure regularly outside of the clinic? No     Are you following a low salt diet? Yes    Are your blood pressures ever more than 140 on the top number (systolic) OR more   than 90 on the bottom number (diastolic), for example 140/90? Does not check      Today's PHQ-2 Score:   PHQ-2 ( 1999 Pfizer) 12/3/2020 12/2/2020   Q1: Little interest or pleasure in doing things 1 1   Q2: Feeling down, depressed or hopeless 1 1   PHQ-2 Score 2 2   Q1: Little interest or pleasure in doing things - Several days   Q2: Feeling down, depressed or hopeless - Several days   PHQ-2 Score - 2       Abuse: Current or Past(Physical, Sexual or Emotional)- No  Do you feel safe in your environment? Yes        Social History " "    Tobacco Use     Smoking status: Never Smoker     Smokeless tobacco: Never Used   Substance Use Topics     Alcohol use: Yes     Comment: rare      If you drink alcohol do you typically have >3 drinks per day or >7 drinks per week? No                      Last PSA:   PSA   Date Value Ref Range Status   10/25/2019 0.65 0 - 4 ug/L Final     Comment:     Assay Method:  Chemiluminescence using Siemens Vista analyzer       Reviewed orders with patient. Reviewed health maintenance and updated orders accordingly - Yes  Reviewed and updated as needed this visit by clinical staff  Tobacco  Allergies  Meds  Problems  Med Hx  Surg Hx  Fam Hx  Soc Hx          Reviewed and updated as needed this visit by Provider  Tobacco  Allergies  Meds  Problems  Med Hx  Surg Hx  Fam Hx           ROS:  Constitutional, HEENT, cardiovascular, pulmonary, GI, , musculoskeletal, neuro, skin, endocrine and psych systems are negative, except as otherwise noted.  OBJECTIVE:   BP (!) 140/80   Pulse 74   Temp 96.3  F (35.7  C) (Tympanic)   Ht 1.74 m (5' 8.5\")   Wt 112.9 kg (249 lb)   SpO2 99%   BMI 37.31 kg/m    EXAM:  GENERAL: healthy, alert and no distress  EYES: Eyes grossly normal to inspection, PERRL and conjunctivae and sclerae normal  HENT: ear canals and TM's normal, nose and mouth without ulcers or lesions  NECK: no adenopathy, no asymmetry, masses, or scars and thyroid normal to palpation  RESP: lungs clear to auscultation - no rales, rhonchi or wheezes  BREAST: normal without masses, tenderness or nipple discharge and no palpable axillary masses or adenopathy  CV: regular rate and rhythm, normal S1 S2, no S3 or S4, no murmur, click or rub, no peripheral edema and peripheral pulses strong  ABDOMEN: soft, nontender, no hepatosplenomegaly, no masses and bowel sounds normal   (male): normal male genitalia without lesions or urethral discharge, no hernia  MS: no gross musculoskeletal defects noted, no edema  SKIN: " "onycholysis of large toenails otherwise  suspicious lesions or rashes  NEURO: Normal strength and tone, mentation intact and speech normal  PSYCH: mentation appears normal, affect normal/bright  LYMPH: no cervical, supraclavicular, axillary, or inguinal adenopathy  RECTAL: declined exam      ASSESSMENT/PLAN:   Routine general medical examination at a health care facility      Hyperlipidemia LDL goal <100  Controlled - continue medication.   - Lipid panel reflex to direct LDL Fasting  - atorvastatin (LIPITOR) 20 MG tablet  Dispense: 90 tablet; Refill: 3    Morbid obesity (H)  Diet and activity    Hypertension goal BP (blood pressure) < 130/80  Borderline control - will add hydrochlorothiazide and recheck in ~ 1-2 weeks   - BASIC METABOLIC PANEL  - Albumin Random Urine Quantitative with Creat Ratio  - lisinopril (ZESTRIL) 40 MG tablet  Dispense: 90 tablet; Refill: 3  - hydrochlorothiazide (HYDRODIURIL) 12.5 MG tablet  Dispense: 90 tablet; Refill: 3    Onychomycosis  Discussed treatment options - will do pulse therapy:  - terbinafine (LAMISIL) 250 MG tablet  Dispense: 28 tablet; Refill: 0    Screening for prostate cancer  - Prostate spec antigen screen    Screening, deficiency anemia, iron  - CBC with platelets    Encounter for immunization  - FLU VAC, QUADRIVALENT (RIV4) RECOMBINANT DNA, IM    History of colonic polyps  Screen for colon cancer  - GASTROENTEROLOGY ADULT REF PROCEDURE ONLY      COUNSELING:  Reviewed preventive health counseling, as reflected in patient instructions    Estimated body mass index is 37.31 kg/m  as calculated from the following:    Height as of this encounter: 1.74 m (5' 8.5\").    Weight as of this encounter: 112.9 kg (249 lb).  Weight management plan: Discussed healthy diet and exercise guidelines     reports that he has never smoked. He has never used smokeless tobacco.      Return in about 2 weeks (around 12/17/2020) for blood pressure recheck, with nurse ; wellness in 1 year.         "   Bubba Marsh MD     [address] [address]  [email or web address]   Office: [phone]  Pager: [phone]

## 2020-12-04 LAB
ANION GAP SERPL CALCULATED.3IONS-SCNC: 4 MMOL/L (ref 3–14)
BUN SERPL-MCNC: 19 MG/DL (ref 7–30)
CALCIUM SERPL-MCNC: 8.6 MG/DL (ref 8.5–10.1)
CHLORIDE SERPL-SCNC: 107 MMOL/L (ref 94–109)
CHOLEST SERPL-MCNC: 108 MG/DL
CO2 SERPL-SCNC: 28 MMOL/L (ref 20–32)
CREAT SERPL-MCNC: 1.09 MG/DL (ref 0.66–1.25)
CREAT UR-MCNC: 46 MG/DL
GFR SERPL CREATININE-BSD FRML MDRD: 71 ML/MIN/{1.73_M2}
GLUCOSE SERPL-MCNC: 91 MG/DL (ref 70–99)
HDLC SERPL-MCNC: 29 MG/DL
LDLC SERPL CALC-MCNC: 64 MG/DL
MICROALBUMIN UR-MCNC: 6 MG/L
MICROALBUMIN/CREAT UR: 12.21 MG/G CR (ref 0–17)
NONHDLC SERPL-MCNC: 79 MG/DL
POTASSIUM SERPL-SCNC: 3.8 MMOL/L (ref 3.4–5.3)
PSA SERPL-ACNC: 0.58 UG/L (ref 0–4)
SODIUM SERPL-SCNC: 139 MMOL/L (ref 133–144)
TRIGL SERPL-MCNC: 75 MG/DL

## 2020-12-06 PROBLEM — R31.29 MICROHEMATURIA: Status: ACTIVE | Noted: 2020-12-06

## 2020-12-07 NOTE — RESULT ENCOUNTER NOTE
Dear Nathan,    Here is a summary of your recent test results:  -Normal red blood cell (hgb) levels, normal white blood cell count and normal platelet levels.  -PSA (prostate specific antigen) test is normal.  This indicates a low likelihood of prostate cancer.  ADVISE: rechecking this in 1 year.  -Cholesterol levels are at your goal levels.  ADVISE: continuing your medication, a regular exercise program with at least 150 minutes of aerobic exercise per week, and eating a low saturated fat/low carbohydrate diet.  Also, you should recheck this fasting cholesterol panel in 12 months.  -Kidney function is normal (Cr, GFR), Sodium is normal, Potassium is normal, Calcium is normal, Glucose is normal.   -Microalbumin (urine protein) test is normal.  ADVISE: rechecking this annually.  -Urine has red blood cells noted.  ADVISE: rechecking a urinalysis and urine culture in one month - please make a lab appointment.    For additional lab test information, labtestsonline.org is an excellent reference.    In addition, here is a list of due or overdue Health Maintenance reminders:  Zoster (Shingles) Vaccine(1 of 2) due on 03/14/2006  Colorectal Cancer Screening due on 02/03/2020    Please call us at [phone] (or use MyFab) to address the above recommendations if needed.           Thank you very much for trusting me and [facility].     Have a peaceful day.    Healthy regards,  Bubba Marsh MD

## 2021-01-05 ENCOUNTER — MYC MEDICAL ADVICE (OUTPATIENT)
Dept: FAMILY MEDICINE | Facility: CLINIC | Age: 66
End: 2021-01-05

## 2021-01-05 DIAGNOSIS — Z13.1 SCREENING FOR DIABETES MELLITUS: Primary | ICD-10-CM

## 2021-01-06 DIAGNOSIS — Z13.1 SCREENING FOR DIABETES MELLITUS: ICD-10-CM

## 2021-01-06 DIAGNOSIS — R31.29 MICROHEMATURIA: ICD-10-CM

## 2021-01-06 LAB
ALBUMIN UR-MCNC: NEGATIVE MG/DL
APPEARANCE UR: CLEAR
BACTERIA #/AREA URNS HPF: ABNORMAL /HPF
BILIRUB UR QL STRIP: NEGATIVE
COLOR UR AUTO: YELLOW
GLUCOSE UR STRIP-MCNC: NEGATIVE MG/DL
HBA1C MFR BLD: 6 % (ref 0–5.6)
HGB UR QL STRIP: ABNORMAL
KETONES UR STRIP-MCNC: NEGATIVE MG/DL
LEUKOCYTE ESTERASE UR QL STRIP: NEGATIVE
NITRATE UR QL: NEGATIVE
NON-SQ EPI CELLS #/AREA URNS LPF: ABNORMAL /LPF
PH UR STRIP: 6.5 PH (ref 5–7)
RBC #/AREA URNS AUTO: ABNORMAL /HPF
SOURCE: ABNORMAL
SP GR UR STRIP: 1.02 (ref 1–1.03)
UROBILINOGEN UR STRIP-ACNC: 0.2 EU/DL (ref 0.2–1)
WBC #/AREA URNS AUTO: ABNORMAL /HPF

## 2021-01-06 PROCEDURE — 87086 URINE CULTURE/COLONY COUNT: CPT | Performed by: FAMILY MEDICINE

## 2021-01-06 PROCEDURE — 81001 URINALYSIS AUTO W/SCOPE: CPT | Performed by: FAMILY MEDICINE

## 2021-01-06 PROCEDURE — 83036 HEMOGLOBIN GLYCOSYLATED A1C: CPT | Performed by: FAMILY MEDICINE

## 2021-01-06 PROCEDURE — 36415 COLL VENOUS BLD VENIPUNCTURE: CPT | Performed by: FAMILY MEDICINE

## 2021-01-06 NOTE — TELEPHONE ENCOUNTER
Please see my chart message and review and MD JAMES is out of the office   There are no recent notes about an A1C but over a year ago 10/25/2019 there was a note for one       Please advise     Thank you     Verna Hoang RN, BSN  [facility]

## 2021-01-08 LAB
BACTERIA SPEC CULT: NO GROWTH
Lab: NORMAL
SPECIMEN SOURCE: NORMAL

## 2021-01-13 ENCOUNTER — MYC MEDICAL ADVICE (OUTPATIENT)
Dept: FAMILY MEDICINE | Facility: CLINIC | Age: 66
End: 2021-01-13

## 2021-01-13 DIAGNOSIS — R31.29 MICROHEMATURIA: Primary | ICD-10-CM

## 2021-01-13 NOTE — TELEPHONE ENCOUNTER
Lab Results   Component Value Date    A1C 6.0 01/06/2021    A1C 5.7 10/25/2019    A1C 5.9 01/12/2015     Mychart sent.    Kaleb VANESSA RN   [facility]

## 2021-01-14 NOTE — RESULT ENCOUNTER NOTE
Dear Nathan,    Here is a summary of your recent test results:  -A1C (diabetic test) is mildly elevated and a sign of prediabetes.  ADVISE: Rechecking this in 1 year   -Urine still has red blood cells noted.  ADVISE:  seeing a urologist.   I have referred you to: [facility] [place] and they will call you to set up an appointment.  -Urine culture is normal.  There is no need for antibiotics at this point.  If new, worsening or persistent symptoms occur, then you should call or return for a recheck.    For additional lab test information, [email or web address] is an excellent reference.    In addition, here is a list of due or overdue Health Maintenance reminders:    Colorectal Cancer Screening due on 02/03/2020      Please call us at [phone] (or use [employer]) to address the above recommendations if needed.           Thank you very much for trusting me and [facility].     Have a peaceful day.    Healthy regards,  Bubba Marsh MD

## 2021-01-14 NOTE — TELEPHONE ENCOUNTER
Routing to PCP, advise urology? Order pended  Per LOV result note 12/3/21  -Urine has red blood cells noted.  ADVISE: rechecking a urinalysis and urine culture in one month - please make a lab appointment.     Mychart sent to advise recheck urine.    Kaleb VANESSA RN   [facility]

## 2021-02-01 ENCOUNTER — MYC MEDICAL ADVICE (OUTPATIENT)
Dept: FAMILY MEDICINE | Facility: CLINIC | Age: 66
End: 2021-02-01

## 2021-02-01 DIAGNOSIS — Z12.11 SCREEN FOR COLON CANCER: Primary | ICD-10-CM

## 2021-02-01 NOTE — TELEPHONE ENCOUNTER
[employer] message sent.    Kaleb VANESSA RN   [facility] [place][note 2]

## 2021-02-02 NOTE — TELEPHONE ENCOUNTER
Please see my chart message below     Please review and advise - see pended     Thank you     Verna Hoang RN, BSN  [place] [facility]

## 2021-09-07 ENCOUNTER — OFFICE VISIT (OUTPATIENT)
Dept: FAMILY MEDICINE | Facility: CLINIC | Age: 66
End: 2021-09-07
Payer: COMMERCIAL

## 2021-09-07 VITALS
DIASTOLIC BLOOD PRESSURE: 78 MMHG | BODY MASS INDEX: 36.43 KG/M2 | HEART RATE: 80 BPM | TEMPERATURE: 97.5 F | WEIGHT: 246 LBS | OXYGEN SATURATION: 98 % | HEIGHT: 69 IN | SYSTOLIC BLOOD PRESSURE: 144 MMHG

## 2021-09-07 DIAGNOSIS — E66.01 MORBID OBESITY (H): ICD-10-CM

## 2021-09-07 DIAGNOSIS — M79.671 RIGHT FOOT PAIN: ICD-10-CM

## 2021-09-07 DIAGNOSIS — Z13.6 SCREENING FOR AAA (ABDOMINAL AORTIC ANEURYSM): ICD-10-CM

## 2021-09-07 DIAGNOSIS — L84 CORNS AND CALLOSITIES: Primary | ICD-10-CM

## 2021-09-07 DIAGNOSIS — R31.9 HEMATURIA, UNSPECIFIED TYPE: ICD-10-CM

## 2021-09-07 LAB
ALBUMIN UR-MCNC: NEGATIVE MG/DL
APPEARANCE UR: CLEAR
BACTERIA #/AREA URNS HPF: ABNORMAL /HPF
BILIRUB UR QL STRIP: NEGATIVE
COLOR UR AUTO: YELLOW
GLUCOSE UR STRIP-MCNC: NEGATIVE MG/DL
HGB UR QL STRIP: ABNORMAL
KETONES UR STRIP-MCNC: NEGATIVE MG/DL
LEUKOCYTE ESTERASE UR QL STRIP: NEGATIVE
NITRATE UR QL: NEGATIVE
PH UR STRIP: 6 [PH] (ref 5–7)
RBC #/AREA URNS AUTO: ABNORMAL /HPF
SP GR UR STRIP: 1.01 (ref 1–1.03)
UROBILINOGEN UR STRIP-ACNC: 0.2 E.U./DL
WBC #/AREA URNS AUTO: ABNORMAL /HPF

## 2021-09-07 PROCEDURE — 81001 URINALYSIS AUTO W/SCOPE: CPT | Performed by: FAMILY MEDICINE

## 2021-09-07 PROCEDURE — 99214 OFFICE O/P EST MOD 30 MIN: CPT | Performed by: FAMILY MEDICINE

## 2021-09-07 ASSESSMENT — MIFFLIN-ST. JEOR: SCORE: 1883.29

## 2021-09-07 NOTE — PROGRESS NOTES
"Assessment & Plan   Corns and callosities  Right foot pain  Corn noted and trimmed with 15 blade, felt better    Morbid obesity (H)  Diet and exercise    Screening for AAA (abdominal aortic aneurysm)  Low risk and screening not indicated  - Abdominal Aortic Aneurysm Screening/Tracking    Hematuria, unspecified type  Mild hematuria noted and will recheck in 1 month  - UA with Microscopic - lab collect  - UA with Microscopic - lab collect  - Urine Microscopic Exam      BMI:   Estimated body mass index is 36.86 kg/m  as calculated from the following:    Height as of this encounter: 1.74 m (5' 8.5\").    Weight as of this encounter: 111.6 kg (246 lb).   Weight management plan: Discussed healthy diet and exercise guidelines      Return in about 3 months (around 12/6/2021) for wellness exam with fasting labs with Bubba Marsh MD.      Bubba Marsh MD      [address] [address]  [employer]   Office: [phone]       Jhony Evans is a 66 year old who presents for the following health issues     HPI     Foot/Ankle Pain    Onset: ongoing issue for several years -     Description:   Right ankle/foot  Location: Foot all over his whole foot - up to his achilles tendon especially  Character: Dull ache constantly and some sensations that he can't describe - feels almost like a tingling  Constant/intermittent:  constant  History of trauma: no                 Was there immediate pain after trauma: no                 Able to bear weight yes                 Worse after activity or rest: YES, seems to get worse after long walks    Accompany signs and symptoms:  Swelling: no  Bruising (ecchymosis): no  Redness: no  Warmth: no  Weakness: no        Numbness or tingling: YES, intermittently    History:  Any previous injury to ankle/foot: no  Any previous ankle/foot surgery: no  Any previous tests/studies done: none    Therapies tried and outcome: Advil and rest seem to help " "sometimes; years ago tried orthotics and no major.     Left foot has a possible wart (actual corn on exam)    Right decreased    Review of Systems   Constitutional, HEENT, cardiovascular, pulmonary, GI, , musculoskeletal, neuro, skin, endocrine and psych systems are negative, except as otherwise noted.      Objective    BP (!) 144/78 (BP Location: Right arm, Cuff Size: Adult Regular)   Pulse 80   Temp 97.5  F (36.4  C) (Tympanic)   Ht 1.74 m (5' 8.5\")   Wt 111.6 kg (246 lb)   SpO2 98%   BMI 36.86 kg/m    Body mass index is 36.86 kg/m .  Physical Exam   GENERAL: healthy, alert and no distress  NECK: no adenopathy, no asymmetry, masses, or scars and thyroid normal to palpation  Right ear canal with wax - irrigated  RESP: lungs clear to auscultation - no rales, rhonchi or wheezes  CV: regular rate and rhythm, normal S1 S2, no S3 or S4, no murmur, click or rub, no peripheral edema and peripheral pulses strong  MS: no gross musculoskeletal defects noted, no edema  SKIN: left forefoot with 5 mm  Firm corn (tirmmed) otherwise  no suspicious lesions or rashes  NEURO: Normal strength and tone, mentation intact and speech normal              "

## 2021-09-07 NOTE — RESULT ENCOUNTER NOTE
Dear Nathan,    Here is a summary of your recent test results:  -Urine still has red blood cells noted.  ADVISE:  seeing a urologist as discussed previously.    For additional lab test information, labtestsonline.org is an excellent reference.    In addition, here is a list of due or overdue Health Maintenance reminders:  Zoster (Shingles) Vaccine(1 of 2) Never done  Colorectal Cancer Screening due on 02/03/2020  PHQ-2 due on 01/01/2021  FALL RISK ASSESSMENT Never done  Flu Vaccine(1) due on 09/01/2021  Pneumococcal Vaccine(1 of 1 - PPSV23) due on 03/14/2021    Please call us at [phone] (or use [facility]) to address the above recommendations if needed.           Thank you very much for trusting me and [facility].     Have a peaceful day.    Healthy regards,  Bubba Marsh MD

## 2021-09-14 ENCOUNTER — MYC MEDICAL ADVICE (OUTPATIENT)
Dept: FAMILY MEDICINE | Facility: CLINIC | Age: 66
End: 2021-09-14

## 2021-09-14 DIAGNOSIS — M79.671 PAIN IN BOTH FEET: Primary | ICD-10-CM

## 2021-09-14 DIAGNOSIS — M79.672 PAIN IN BOTH FEET: Primary | ICD-10-CM

## 2021-09-15 NOTE — TELEPHONE ENCOUNTER
LOV: 9/7/2021    Please see my chart message below     Please review and advise     Thank you     Verna Hoang RN, BSN  [place] [facility]

## 2021-09-24 ENCOUNTER — THERAPY VISIT (OUTPATIENT)
Dept: PHYSICAL THERAPY | Facility: CLINIC | Age: 66
End: 2021-09-24
Attending: FAMILY MEDICINE
Payer: COMMERCIAL

## 2021-09-24 DIAGNOSIS — M79.671 PAIN IN BOTH FEET: ICD-10-CM

## 2021-09-24 DIAGNOSIS — M25.579 PAIN IN JOINT, ANKLE AND FOOT, UNSPECIFIED LATERALITY: ICD-10-CM

## 2021-09-24 DIAGNOSIS — M79.672 PAIN IN BOTH FEET: ICD-10-CM

## 2021-09-24 PROCEDURE — 97161 PT EVAL LOW COMPLEX 20 MIN: CPT | Mod: GP | Performed by: PHYSICAL THERAPIST

## 2021-09-24 PROCEDURE — 97110 THERAPEUTIC EXERCISES: CPT | Mod: GP | Performed by: PHYSICAL THERAPIST

## 2021-09-24 NOTE — PROGRESS NOTES
[facility] Initial Evaluation -- Lower Extremity    Evaluation Date: September 24, 2021  Hector Chan is a 66 year old male with a bilateral foot condition.   Referral: Dr. Marsh  Work mechanical stresses: retired   Employment status: retired  Leisure mechanical stresses: minimal exercising  Functional disability score: see flow   VAS score (0-10): 1/10  Patient goals/expectations:  To control/manage pain better    HISTORY:  Answers for HPI/ROS submitted by the patient on 9/19/2021  Reason for Visit:: Constant pain in my feet  How problem occurred:: has been here for along time, not sure what caused it  General health as reported by patient: good  Please check all that apply to your current or past medical history: high blood pressure, overweight  Medical allergies: none  Surgeries: none  Medications you are currently taking: high blood pressure medication  Occupation:: retired  What are your primary job tasks: computer work, prolonged sitting      Present symptoms: bilateral R>L Achilles,   Pain quality (sharp/shooting/stabbing/aching/burning/cramping):  Sharp, aching    Present since (onset date): September 2021 (first part of month); states he switched chairs and this flared up his feet.     Symptoms (improving/unchaning/worsening):  improving.      Symptoms commenced as a result of: no apparent reason, high arches   Condition occurred in the following environment:      Symptoms at onset: Achilles bilateral, R foot, L foot  Paresthesia (yes/no):  Yes, some tingling  Spinal history: reports no issues   Cough/Sneeze (pos/neg):  neg    Constant symptoms:   Intermittent symptoms: feet    Symptoms are worse with the following: Always First few steps, Sometimes Walking, Sometimes On the move and Time of day - Always AM   Symptoms are better with the following: Sometimes Walking, Sometimes On the move and Time of day - No effect and Sometimes as the day progresses    Continued use makes the pain  "(better/worse/no effect): no effect    Disturbed night (yes/no): no      Pain at rest (yes/no):  Not currently  Site (back/hip/knee/ankle/foot):  Feet     Other questions (swelling/clicking/locking/giving way/falling):  neg     Previous episodes: chronic over many years  Previous treatments: some PT previously, given stretching (poor compliance)    Specific Questions:  General health (excellent/good/fair/poor):  good  Pertinent medical history includes: High blood pressure and Overweight  Medications (nil/NSAIDS/analg/steroids/anticoag/other):  NSAIDS and Other - High blood pressure  Medical allergies:  none  Imaging (none/Xray/MRI/other):  none  Recent or major surgery (yes/no):  no  Night pain (yes/no):  no  Accidents (yes/no):  none  Unexplained weight loss (yes/no):  none  Barriers at home: none  Other red flags: none    Sites for physical examination (back/hip/knee/ankle/foot/other): bilateral feet/back    EXAMINATION    Posture:  Sitting (good/fair/poor): fair    Correction of Posture (better/worse/no effect/NA): some increased notice of stiffness in low back  Standing (good/fair/poor): fair  Other observations:  Ambulates with L toe out >R; pes cavus;     Neurological: (NA/motor/sensory/reflexes/dural): ?+Slump bilat (vs tight HS/Gastroc)    Baselines (pain or functional activity): pain, free right now with ambulation    Extremities (Hip / Knee / Ankle / Foot): feet    Movement Loss [place] Mod Min Nil Pain   Flexion        Extension        Abduction        Adduction        Internal Rotation        External Rotation        Dorsiflexion  0 bilaterally      Plantarflexion    x    Inversion    x    Eversion    x      Passive Movement (+/- over pressure)/(PDM/ERP):  painfree  Resisted Test Response (pain): MMT is wnl;   Other Tests: decr flex Gastroc, Soleus, HS, hip ER bilaterally    Spine:  Movement loss: Ext=min to nil loss  Effect of repeated movements: NE/NE/NE on ROM or \"slump\"  Effect of static positioning: "   Spine testing (not relevant/relevant/secondary problem): not relevant    Baseline Symptoms:   Repeated Tests Symptom Response Mechanical Response   Active/Passive movement, resisted test, functional test During -  Produce, Abolish, Increase, Decrease, NE After -  Better, Worse, NB, NW, NE Effect -   ? or ? ROM, strength or key functional test No   Effect                               Effect of static positioning                Provisional Classification (Extremity/Spine):  Extremity - Inconclusive/Other - Mechanically Inconclusive      Princicple of Management:   Education:  Warm up, stretching parameters/frequency    Equipment provided:    Exercise and dosage:  See flow sheet    ASSESSMENT/PLAN:    Patient is a 66 year old male with both sides ankle complaints.    Patient has the following significant findings with corresponding treatment plan.                Diagnosis 1:  Bilateral foot pain  Pain -  manual therapy, self management, education and home program  Decreased ROM/flexibility - manual therapy and therapeutic exercise  Decreased joint mobility - manual therapy and therapeutic exercise  Decreased strength - therapeutic exercise and therapeutic activities  Decreased function - therapeutic activities    Therapy Evaluation Codes:   1) History comprised of:   Personal factors that impact the plan of care:      None.    Comorbidity factors that impact the plan of care are:      High blood pressure and Overweight.     Medications impacting care: High blood pressure.  2) Examination of Body Systems comprised of:   Body structures and functions that impact the plan of care:      Ankle.   Activity limitations that impact the plan of care are:      Standing and Walking.  3) Clinical presentation characteristics are:   Evolving/Changing.  4) Decision-Making    Low complexity using standardized patient assessment instrument and/or measureable assessment of functional outcome.  Cumulative Therapy Evaluation is: Low  complexity.    Previous and current functional limitations:  (See Goal Flow Sheet for this information)    Short term and Long term goals: (See Goal Flow Sheet for this information)     Communication ability:  Patient appears to be able to clearly communicate and understand verbal and written communication and follow directions correctly.  Treatment Explanation - The following has been discussed with the patient:   RX ordered/plan of care  Anticipated outcomes  Possible risks and side effects  This patient would benefit from PT intervention to resume normal activities.   Rehab potential is good.    Frequency:  1 X week, once daily  Duration:  for 6 weeks  Discharge Plan:  Achieve all LTG.  Independent in home treatment program.  Reach maximal therapeutic benefit.    Please refer to the daily flowsheet for treatment today, total treatment time and time spent performing 1:1 timed codes.

## 2021-10-03 ENCOUNTER — HEALTH MAINTENANCE LETTER (OUTPATIENT)
Age: 66
End: 2021-10-03

## 2021-10-05 ENCOUNTER — THERAPY VISIT (OUTPATIENT)
Dept: PHYSICAL THERAPY | Facility: CLINIC | Age: 66
End: 2021-10-05
Payer: COMMERCIAL

## 2021-10-05 DIAGNOSIS — M25.579 PAIN IN JOINT, ANKLE AND FOOT, UNSPECIFIED LATERALITY: ICD-10-CM

## 2021-10-05 PROCEDURE — 97140 MANUAL THERAPY 1/> REGIONS: CPT | Mod: GP | Performed by: PHYSICAL THERAPIST

## 2021-10-05 PROCEDURE — 97110 THERAPEUTIC EXERCISES: CPT | Mod: GP | Performed by: PHYSICAL THERAPIST

## 2021-10-12 ENCOUNTER — THERAPY VISIT (OUTPATIENT)
Dept: PHYSICAL THERAPY | Facility: CLINIC | Age: 66
End: 2021-10-12
Payer: COMMERCIAL

## 2021-10-12 DIAGNOSIS — M25.579 PAIN IN JOINT, ANKLE AND FOOT, UNSPECIFIED LATERALITY: ICD-10-CM

## 2021-10-12 PROCEDURE — 97140 MANUAL THERAPY 1/> REGIONS: CPT | Mod: GP | Performed by: PHYSICAL THERAPIST

## 2021-10-12 PROCEDURE — 97110 THERAPEUTIC EXERCISES: CPT | Mod: GP | Performed by: PHYSICAL THERAPIST

## 2021-10-12 PROCEDURE — 97112 NEUROMUSCULAR REEDUCATION: CPT | Mod: GP | Performed by: PHYSICAL THERAPIST

## 2021-10-26 ENCOUNTER — THERAPY VISIT (OUTPATIENT)
Dept: PHYSICAL THERAPY | Facility: CLINIC | Age: 66
End: 2021-10-26
Payer: COMMERCIAL

## 2021-10-26 DIAGNOSIS — M25.579 PAIN IN JOINT, ANKLE AND FOOT, UNSPECIFIED LATERALITY: ICD-10-CM

## 2021-10-26 PROCEDURE — 97110 THERAPEUTIC EXERCISES: CPT | Mod: GP | Performed by: PHYSICAL THERAPIST

## 2021-10-26 PROCEDURE — 97140 MANUAL THERAPY 1/> REGIONS: CPT | Mod: GP | Performed by: PHYSICAL THERAPIST

## 2021-11-02 ENCOUNTER — LAB (OUTPATIENT)
Dept: LAB | Facility: CLINIC | Age: 66
End: 2021-11-02
Payer: COMMERCIAL

## 2021-11-02 DIAGNOSIS — Z12.11 SCREEN FOR COLON CANCER: ICD-10-CM

## 2021-11-02 PROCEDURE — 82274 ASSAY TEST FOR BLOOD FECAL: CPT

## 2021-11-05 LAB — HEMOCCULT STL QL IA: NEGATIVE

## 2021-11-10 ENCOUNTER — VIRTUAL VISIT (OUTPATIENT)
Dept: UROLOGY | Facility: CLINIC | Age: 66
End: 2021-11-10
Attending: FAMILY MEDICINE
Payer: COMMERCIAL

## 2021-11-10 DIAGNOSIS — R31.0 GROSS HEMATURIA: Primary | ICD-10-CM

## 2021-11-10 DIAGNOSIS — R31.29 MICROHEMATURIA: ICD-10-CM

## 2021-11-10 PROBLEM — Z86.0100 HISTORY OF COLONIC POLYPS: Status: RESOLVED | Noted: 2018-06-28 | Resolved: 2021-11-10

## 2021-11-10 PROCEDURE — 99204 OFFICE O/P NEW MOD 45 MIN: CPT | Mod: 95 | Performed by: PHYSICIAN ASSISTANT

## 2021-11-10 RX ORDER — ASPIRIN 81 MG/1
81 TABLET, CHEWABLE ORAL DAILY
COMMUNITY

## 2021-11-10 ASSESSMENT — ENCOUNTER SYMPTOMS
HEMATURIA: 1
FREQUENCY: 0
FLANK PAIN: 0
SHORTNESS OF BREATH: 0
CHILLS: 0
VOMITING: 0
DYSURIA: 0
NAUSEA: 0
FEVER: 0

## 2021-11-10 NOTE — PATIENT INSTRUCTIONS
- Urine cytology to look for abnormal cells.  - CT scan: Please call [phone] to schedule this at the [facility] or [facility] ([place]).   - Cystoscopy with the urologist to evaluate the interior of the bladder. Follow up as recommended by the urologist.

## 2021-11-10 NOTE — LETTER
11/10/2021       RE: Hector Chan  [address]  [place] [address]     Dear Colleague,    Thank you for referring your patient, Hector Chan, to the [facility] [place] [facility]. Please see a copy of my visit note below.    Subjective      REQUESTING PROVIDER   Shashank Marsh     REASON FOR CONSULT   Microscopic hematuria    HISTORY OF PRESENT ILLNESS   Mr. Chan is a 66 year old male seen today in the urology clinic in consultation for microscopic hematuria.  This was first detected in January 2021 with 2-5 red blood cells per high-powered field.  Urine culture at that time was negative.  Repeat urinalysis on 09/07/2021 showed 5-10 RBCs per high-powered field.  Microscopic hematuria was also noted in 2020..    The patient does not feel that he is ever had gross hematuria, but his vision is not very good.  His wife has commented that she believes he has had visible blood in the urine for the first time approximately a year ago and possibly since then.  No smoking history.  He generally feels like he urinates well.  He is not on any prostate medication.  He has some occasional urgency and postvoid dribbling.  He denies dysuria, frequency, urge incontinence, and incomplete bladder emptying.  He does note nocturia of 1, approximately every other night.    Approximately 20 years ago, he had flank pain and was diagnosed with presumptive kidney stone.  He never caught a stone.  He denies a history of urinary tract infections.      PSA up to date: 12/3/20 0.58    Blood thinners: 81 mg ASA    Retention or clots: No    Hematuria Risk Factors:  Age >40: Yes   Smoking history: No  Occupational exposure to chemicals or dyes (ie, benzenes, aromatic amines): no  History of urologic disorder or disease: no  History of irritative voiding symptoms: no  History of urinary tract infection: no  Analgesic abuse: no  History of pelvic  irradiation: no    The following portions of the patient's history were reviewed and updated as appropriate: allergies, current medications, past family history, past medical history, past social history, past surgical history and problem list.     REVIEW OF SYSTEMS   Review of Systems   Constitutional: Negative for chills and fever.   Respiratory: Negative for shortness of breath.    Cardiovascular: Negative for chest pain.   Gastrointestinal: Negative for nausea and vomiting.   Genitourinary: Positive for hematuria. Negative for dysuria, flank pain and frequency.      Per HPI.     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  .     Family History:   No known family history of nephrolithiasis or  malignancy.    Objective      PHYSICAL EXAM     GENERAL: Healthy, alert and no distress  EYES: Eyes grossly normal to inspection.  No discharge or erythema, or obvious scleral/conjunctival abnormalities.  HENT: Normal cephalic/atraumatic.  External ears, nose and mouth without ulcers or lesions.  No nasal drainage visible.  NECK: No asymmetry, visible masses or scars  RESP: No audible wheeze, cough, or visible cyanosis.  No visible retractions or increased work of breathing.    MS: No gross musculoskeletal defects noted.  Normal range of motion.  No visible edema.  SKIN: Visible skin clear. No significant rash, abnormal pigmentation or lesions.  NEURO: Cranial nerves grossly intact.  Mentation and speech appropriate for age.  PSYCH: Mentation appears normal, affect  normal/bright, judgement and insight intact, normal speech and appearance well-groomed.    LABORATORY   Recent Labs   Lab Test 09/07/21  1202   COLOR Yellow   APPEARANCE Clear   URINEGLC Negative   URINEBILI Negative   URINEKETONE Negative   SG 1.015   UBLD Moderate*   URINEPH 6.0   PROTEIN Negative   UROBILINOGEN 0.2   NITRITE Negative   LEUKEST Negative   RBCU 5-10*   WBCU 0-5     Assessment & Plan    1. Gross hematuria    2. Microhematuria      I had the pleasure today of meeting with Mr. Chan and his wife to discuss his microscopic and likely gross hematuria.  The differential diagnosis at this point includes stone disease, infection, BPH, prostatitis, urothelial malignancy, renal disorder versus another yet unknown diagnosis.  Many times, we do not find a cause, but we should rule out bad causes.    At this time, recommend proceeding with comprehensive hematuria evaluation to include:  - Urinalysis and micro.   - Urine cytology to look for cells concerning for malignancy.  - CT urogram for upper tract imaging.  - Cystoscopy with the first available urologist to evaluate the interior of the bladder. Follow up for hematuria as recommended by urologist performing cystoscopic evaluation.    -PSA up to date 0.58 12/3/20    Signed by:     Jillian Rai PA-C 11/10/2021 2:53 PM

## 2021-11-10 NOTE — PROGRESS NOTES
Subjective      REQUESTING PROVIDER   Shashank Marsh     REASON FOR CONSULT   Microscopic hematuria    HISTORY OF PRESENT ILLNESS   Mr. Chan is a 66 year old male seen today in the urology clinic in consultation for microscopic hematuria.  This was first detected in January 2021 with 2-5 red blood cells per high-powered field.  Urine culture at that time was negative.  Repeat urinalysis on 09/07/2021 showed 5-10 RBCs per high-powered field.  Microscopic hematuria was also noted in 2020..    The patient does not feel that he is ever had gross hematuria, but his vision is not very good.  His wife has commented that she believes he has had visible blood in the urine for the first time approximately a year ago and possibly since then.  No smoking history.  He generally feels like he urinates well.  He is not on any prostate medication.  He has some occasional urgency and postvoid dribbling.  He denies dysuria, frequency, urge incontinence, and incomplete bladder emptying.  He does note nocturia of 1, approximately every other night.    Approximately 20 years ago, he had flank pain and was diagnosed with presumptive kidney stone.  He never caught a stone.  He denies a history of urinary tract infections.      PSA up to date: 12/3/20 0.58    Blood thinners: 81 mg ASA    Retention or clots: No    Hematuria Risk Factors:  Age >40: Yes   Smoking history: No  Occupational exposure to chemicals or dyes (ie, benzenes, aromatic amines): no  History of urologic disorder or disease: no  History of irritative voiding symptoms: no  History of urinary tract infection: no  Analgesic abuse: no  History of pelvic irradiation: no    The following portions of the patient's history were reviewed and updated as appropriate: allergies, current medications, past family history, past medical history, past social history, past surgical history and problem list.     REVIEW OF SYSTEMS   Review of Systems   Constitutional: Negative for chills and  fever.   Respiratory: Negative for shortness of breath.    Cardiovascular: Negative for chest pain.   Gastrointestinal: Negative for nausea and vomiting.   Genitourinary: Positive for hematuria. Negative for dysuria, flank pain and frequency.      Per HPI.     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  .     Family History:   No known family history of nephrolithiasis or  malignancy.    Objective      PHYSICAL EXAM     GENERAL: Healthy, alert and no distress  EYES: Eyes grossly normal to inspection.  No discharge or erythema, or obvious scleral/conjunctival abnormalities.  HENT: Normal cephalic/atraumatic.  External ears, nose and mouth without ulcers or lesions.  No nasal drainage visible.  NECK: No asymmetry, visible masses or scars  RESP: No audible wheeze, cough, or visible cyanosis.  No visible retractions or increased work of breathing.    MS: No gross musculoskeletal defects noted.  Normal range of motion.  No visible edema.  SKIN: Visible skin clear. No significant rash, abnormal pigmentation or lesions.  NEURO: Cranial nerves grossly intact.  Mentation and speech appropriate for age.  PSYCH: Mentation appears normal, affect normal/bright, judgement and insight intact, normal speech and appearance well-groomed.    LABORATORY   Recent Labs   Lab Test 09/07/21  1202   COLOR Yellow   APPEARANCE Clear   URINEGLC Negative   URINEBILI Negative   URINEKETONE Negative   SG 1.015   UBLD Moderate*   URINEPH 6.0   PROTEIN Negative   UROBILINOGEN 0.2   NITRITE Negative    LEUKEST Negative   RBCU 5-10*   WBCU 0-5     Assessment & Plan    1. Gross hematuria    2. Microhematuria      I had the pleasure today of meeting with Mr. Chan and his wife to discuss his microscopic and likely gross hematuria.  The differential diagnosis at this point includes stone disease, infection, BPH, prostatitis, urothelial malignancy, renal disorder versus another yet unknown diagnosis.  Many times, we do not find a cause, but we should rule out bad causes.    At this time, recommend proceeding with comprehensive hematuria evaluation to include:  - Urinalysis and micro.   - Urine cytology to look for cells concerning for malignancy.  - CT urogram for upper tract imaging.  - Cystoscopy with the first available urologist to evaluate the interior of the bladder. Follow up for hematuria as recommended by urologist performing cystoscopic evaluation.    -PSA up to date 0.58 12/3/20    Signed by:     Jillian Rai PA-C 11/10/2021 2:53 PM

## 2021-11-11 ENCOUNTER — TELEPHONE (OUTPATIENT)
Dept: UROLOGY | Facility: CLINIC | Age: 66
End: 2021-11-11
Payer: COMMERCIAL

## 2021-11-11 DIAGNOSIS — R31.0 GROSS HEMATURIA: Primary | ICD-10-CM

## 2021-11-11 NOTE — TELEPHONE ENCOUNTER
----- Message from Ronda Pierce sent at 11/10/2021  3:06 PM CST -----  CT Urogram, cystoscopy first available urologist after CTU and urine cytology    [facility]

## 2021-12-05 ASSESSMENT — ENCOUNTER SYMPTOMS
HEADACHES: 0
DIARRHEA: 0
SORE THROAT: 0
ARTHRALGIAS: 0
JOINT SWELLING: 0
PARESTHESIAS: 0
DIZZINESS: 0
HEARTBURN: 0
FREQUENCY: 0
COUGH: 0
ABDOMINAL PAIN: 0
DYSURIA: 0
CONSTIPATION: 0
NAUSEA: 0
SHORTNESS OF BREATH: 0
CHILLS: 0
HEMATURIA: 0
MYALGIAS: 1
PALPITATIONS: 0
HEMATOCHEZIA: 0
EYE PAIN: 0
WEAKNESS: 0
NERVOUS/ANXIOUS: 0
FEVER: 0

## 2021-12-05 ASSESSMENT — ACTIVITIES OF DAILY LIVING (ADL): CURRENT_FUNCTION: NO ASSISTANCE NEEDED

## 2021-12-07 ENCOUNTER — OFFICE VISIT (OUTPATIENT)
Dept: FAMILY MEDICINE | Facility: CLINIC | Age: 66
End: 2021-12-07
Payer: COMMERCIAL

## 2021-12-07 VITALS
TEMPERATURE: 98 F | WEIGHT: 248 LBS | HEIGHT: 69 IN | SYSTOLIC BLOOD PRESSURE: 124 MMHG | OXYGEN SATURATION: 99 % | DIASTOLIC BLOOD PRESSURE: 72 MMHG | HEART RATE: 72 BPM | BODY MASS INDEX: 36.73 KG/M2

## 2021-12-07 DIAGNOSIS — Z00.00 ENCOUNTER FOR MEDICARE ANNUAL WELLNESS EXAM: Primary | ICD-10-CM

## 2021-12-07 DIAGNOSIS — E78.5 HYPERLIPIDEMIA LDL GOAL <100: ICD-10-CM

## 2021-12-07 DIAGNOSIS — Z12.11 SCREEN FOR COLON CANCER: ICD-10-CM

## 2021-12-07 DIAGNOSIS — I10 HYPERTENSION GOAL BP (BLOOD PRESSURE) < 130/80: ICD-10-CM

## 2021-12-07 DIAGNOSIS — Z13.0 SCREENING, DEFICIENCY ANEMIA, IRON: ICD-10-CM

## 2021-12-07 DIAGNOSIS — Z12.5 SCREENING FOR PROSTATE CANCER: ICD-10-CM

## 2021-12-07 LAB
ERYTHROCYTE [DISTWIDTH] IN BLOOD BY AUTOMATED COUNT: 12.5 % (ref 10–15)
HCT VFR BLD AUTO: 39.3 % (ref 40–53)
HGB BLD-MCNC: 13.2 G/DL (ref 13.3–17.7)
MCH RBC QN AUTO: 29.7 PG (ref 26.5–33)
MCHC RBC AUTO-ENTMCNC: 33.6 G/DL (ref 31.5–36.5)
MCV RBC AUTO: 88 FL (ref 78–100)
PLATELET # BLD AUTO: 217 10E3/UL (ref 150–450)
RBC # BLD AUTO: 4.45 10E6/UL (ref 4.4–5.9)
WBC # BLD AUTO: 9.7 10E3/UL (ref 4–11)

## 2021-12-07 PROCEDURE — 90732 PPSV23 VACC 2 YRS+ SUBQ/IM: CPT | Performed by: FAMILY MEDICINE

## 2021-12-07 PROCEDURE — 80061 LIPID PANEL: CPT | Performed by: FAMILY MEDICINE

## 2021-12-07 PROCEDURE — 36415 COLL VENOUS BLD VENIPUNCTURE: CPT | Performed by: FAMILY MEDICINE

## 2021-12-07 PROCEDURE — 82043 UR ALBUMIN QUANTITATIVE: CPT | Performed by: FAMILY MEDICINE

## 2021-12-07 PROCEDURE — 90662 IIV NO PRSV INCREASED AG IM: CPT | Performed by: FAMILY MEDICINE

## 2021-12-07 PROCEDURE — 85027 COMPLETE CBC AUTOMATED: CPT | Performed by: FAMILY MEDICINE

## 2021-12-07 PROCEDURE — 80053 COMPREHEN METABOLIC PANEL: CPT | Performed by: FAMILY MEDICINE

## 2021-12-07 PROCEDURE — G0103 PSA SCREENING: HCPCS | Performed by: FAMILY MEDICINE

## 2021-12-07 PROCEDURE — G0009 ADMIN PNEUMOCOCCAL VACCINE: HCPCS | Performed by: FAMILY MEDICINE

## 2021-12-07 PROCEDURE — 99214 OFFICE O/P EST MOD 30 MIN: CPT | Mod: 25 | Performed by: FAMILY MEDICINE

## 2021-12-07 PROCEDURE — G0402 INITIAL PREVENTIVE EXAM: HCPCS | Performed by: FAMILY MEDICINE

## 2021-12-07 PROCEDURE — G0008 ADMIN INFLUENZA VIRUS VAC: HCPCS | Performed by: FAMILY MEDICINE

## 2021-12-07 RX ORDER — ATORVASTATIN CALCIUM 20 MG/1
20 TABLET, FILM COATED ORAL DAILY
Qty: 90 TABLET | Refills: 3 | Status: SHIPPED | OUTPATIENT
Start: 2021-12-07

## 2021-12-07 RX ORDER — HYDROCHLOROTHIAZIDE 12.5 MG/1
12.5 TABLET ORAL DAILY
Qty: 90 TABLET | Refills: 3 | Status: SHIPPED | OUTPATIENT
Start: 2021-12-07

## 2021-12-07 RX ORDER — LISINOPRIL 40 MG/1
40 TABLET ORAL DAILY
Qty: 90 TABLET | Refills: 3 | Status: SHIPPED | OUTPATIENT
Start: 2021-12-07

## 2021-12-07 ASSESSMENT — ENCOUNTER SYMPTOMS
HEMATURIA: 0
WEAKNESS: 0
CONSTIPATION: 0
CHILLS: 0
SHORTNESS OF BREATH: 0
MYALGIAS: 1
FEVER: 0
SORE THROAT: 0
HEMATOCHEZIA: 0
ABDOMINAL PAIN: 0
COUGH: 0
PARESTHESIAS: 0
NAUSEA: 0
DIARRHEA: 0
FREQUENCY: 0
EYE PAIN: 0
ARTHRALGIAS: 0
NERVOUS/ANXIOUS: 0
DIZZINESS: 0
PALPITATIONS: 0
JOINT SWELLING: 0
HEARTBURN: 0
DYSURIA: 0
HEADACHES: 0

## 2021-12-07 ASSESSMENT — ANXIETY QUESTIONNAIRES
3. WORRYING TOO MUCH ABOUT DIFFERENT THINGS: NOT AT ALL
6. BECOMING EASILY ANNOYED OR IRRITABLE: NOT AT ALL
1. FEELING NERVOUS, ANXIOUS, OR ON EDGE: NOT AT ALL
IF YOU CHECKED OFF ANY PROBLEMS ON THIS QUESTIONNAIRE, HOW DIFFICULT HAVE THESE PROBLEMS MADE IT FOR YOU TO DO YOUR WORK, TAKE CARE OF THINGS AT HOME, OR GET ALONG WITH OTHER PEOPLE: NOT DIFFICULT AT ALL
GAD7 TOTAL SCORE: 0
5. BEING SO RESTLESS THAT IT IS HARD TO SIT STILL: NOT AT ALL
7. FEELING AFRAID AS IF SOMETHING AWFUL MIGHT HAPPEN: NOT AT ALL
2. NOT BEING ABLE TO STOP OR CONTROL WORRYING: NOT AT ALL

## 2021-12-07 ASSESSMENT — PATIENT HEALTH QUESTIONNAIRE - PHQ9: 5. POOR APPETITE OR OVEREATING: NOT AT ALL

## 2021-12-07 ASSESSMENT — ACTIVITIES OF DAILY LIVING (ADL): CURRENT_FUNCTION: NO ASSISTANCE NEEDED

## 2021-12-07 ASSESSMENT — MIFFLIN-ST. JEOR: SCORE: 1900.3

## 2021-12-07 NOTE — PROGRESS NOTES
"SUBJECTIVE:   Hector Chan is a 66 year old male who presents for Preventive Visit.      Patient has been advised of split billing requirements and indicates understanding: Yes   Are you in the first 12 months of your Medicare coverage?  Yes,  Visual Acuity:  Right Eye: 20/20 corrected   Left Eye: 20/20 corrected    Both Eyes: 20/20 corrected  Sees an eye doctor    Healthy Habits:     In general, how would you rate your overall health?  Good    Frequency of exercise:  6-7 days/week    Duration of exercise:  Less than 15 minutes    Do you usually eat at least 4 servings of fruit and vegetables a day, include whole grains    & fiber and avoid regularly eating high fat or \"junk\" foods?  No    Taking medications regularly:  No    Barriers to taking medications:  Problems remembering to take them    Medication side effects:  Not applicable    Ability to successfully perform activities of daily living:  No assistance needed    Home Safety:  No safety concerns identified    Hearing Impairment:  Difficulty following a conversation in a noisy restaurant or crowded room, need to ask people to speak up or repeat themselves and difficulty understanding soft or whispered speech    In the past 6 months, have you been bothered by leaking of urine?  No    In general, how would you rate your overall mental or emotional health?  Good      PHQ-2 Total Score: 2    Additional concerns today:  No    Do you feel safe in your environment? Yes    Have you ever done Advance Care Planning? (For example, a Health Directive, POLST, or a discussion with a medical provider or your loved ones about your wishes): No, advance care planning information given to patient to review.  Patient plans to discuss their wishes with loved ones or provider.         Fall risk  Fallen 2 or more times in the past year?: No  Any fall with injury in the past year?: No    Cognitive Screening   1) Repeat 3 items (Leader, Season, Table)    2) Clock draw: NORMAL  3) 3 " item recall: Recalls 3 objects  Results: 3 items recalled: COGNITIVE IMPAIRMENT LESS LIKELY    Mini-CogTM Copyright S Izzy. Licensed by the author for use in [facility]; reprinted with permission (dirk@[facility]). All rights reserved.        Reviewed and updated as needed this visit by clinical staff  Tobacco  Allergies  Meds  Problems  Med Hx  Surg Hx  Fam Hx  Soc Hx         Reviewed and updated as needed this visit by Provider  Tobacco  Allergies  Meds  Problems  Med Hx  Surg Hx  Fam Hx  Soc Hx        Social History     Tobacco Use     Smoking status: Never Smoker     Smokeless tobacco: Never Used   Substance Use Topics     Alcohol use: Not Currently     Comment: 1 or 2 beers a year     If you drink alcohol do you typically have >3 drinks per day or >7 drinks per week? No    Alcohol Use 12/7/2021   Prescreen: >3 drinks/day or >7 drinks/week? -   Prescreen: >3 drinks/day or >7 drinks/week? No       Hyperlipidemia Follow-Up      Are you regularly taking any medication or supplement to lower your cholesterol?   Yes- Lipitor    Are you having muscle aches or other side effects that you think could be caused by your cholesterol lowering medication?  No    Hypertension Follow-up      Do you check your blood pressure regularly outside of the clinic? No     Are you following a low salt diet? Yes    Are your blood pressures ever more than 140 on the top number (systolic) OR more   than 90 on the bottom number (diastolic), for example 140/90? No      Current providers sharing in care for this patient include:  Patient Care Team:  Shashank Marsh MD as PCP - General (Family Practice)  Shashank Marsh MD as Assigned PCP  Keron Taylor MD as MD (Urology)  Jillian Rai PA-C as Assigned Surgical Provider    The following health maintenance items are reviewed in Epic and correct as of today:  Health Maintenance Due   Topic Date Due     ZOSTER IMMUNIZATION (1 of 2) Never done     CMP   "10/25/2020     FALL RISK ASSESSMENT  Never done     Pneumococcal Vaccine: 65+ Years (1 of 1 - PPSV23) Never done     LIPID  12/03/2021     MICROALBUMIN  12/03/2021     CBC  12/03/2021     COLORECTAL CANCER SCREENING  11/03/2021     PSA  12/03/2021     Review of Systems   Constitutional: Negative for chills and fever.   HENT: Negative for congestion, ear pain, hearing loss and sore throat.    Eyes: Negative for pain and visual disturbance.   Respiratory: Negative for cough and shortness of breath.    Cardiovascular: Negative for chest pain, palpitations and peripheral edema.   Gastrointestinal: Negative for abdominal pain, constipation, diarrhea, heartburn, hematochezia and nausea.   Genitourinary: Positive for urgency. Negative for dysuria, frequency, genital sores, hematuria, impotence and penile discharge.   Musculoskeletal: Positive for myalgias. Negative for arthralgias and joint swelling.   Skin: Positive for rash (Mid chest, nonpruritic and chronic.  No exposures).   Neurological: Negative for dizziness, weakness, headaches and paresthesias.   Psychiatric/Behavioral: Negative for mood changes. The patient is not nervous/anxious.      OBJECTIVE:   /72   Pulse 72   Temp 98  F (36.7  C) (Tympanic)   Ht 1.753 m (5' 9\")   Wt 112.5 kg (248 lb)   SpO2 99%   BMI 36.62 kg/m   Estimated body mass index is 36.62 kg/m  as calculated from the following:    Height as of this encounter: 1.753 m (5' 9\").    Weight as of this encounter: 112.5 kg (248 lb).  EXAM:   GENERAL: healthy, alert and no distress  EYES: Eyes grossly normal to inspection, PERRL and conjunctivae and sclerae normal  HENT: ear canals and TM's normal, nose and mouth without ulcers or lesions  NECK: no adenopathy, no asymmetry, masses, or scars and thyroid normal to palpation  RESP: lungs clear to auscultation - no rales, rhonchi or wheezes  BREAST: normal without masses, tenderness or nipple discharge and no palpable axillary masses or " "adenopathy  CV: regular rate and rhythm, normal S1 S2, no S3 or S4, no murmur, click or rub, no peripheral edema and peripheral pulses strong  ABDOMEN: soft, nontender, no hepatosplenomegaly, no masses and bowel sounds normal   (male): normal male genitalia without lesions or urethral discharge, no hernia  MS: no gross musculoskeletal defects noted, no edema  SKIN: no suspicious lesions or rashes  NEURO: Normal strength and tone, mentation intact and speech normal  PSYCH: mentation appears normal, affect normal/bright  LYMPH: no cervical, supraclavicular, axillary, or inguinal adenopathy  RECTAL: declined exam      ASSESSMENT / PLAN:   Encounter for Medicare annual wellness exam      Hypertension goal BP (blood pressure) < 130/80  Controlled - continue medication.   - lisinopril (ZESTRIL) 40 MG tablet  Dispense: 90 tablet; Refill: 3  - hydrochlorothiazide (HYDRODIURIL) 12.5 MG tablet  Dispense: 90 tablet; Refill: 3  - Comprehensive metabolic panel  - Albumin Random Urine Quantitative with Creat Ratio    Hyperlipidemia LDL goal <100  Controlled - continue medication.   - atorvastatin (LIPITOR) 20 MG tablet  Dispense: 90 tablet; Refill: 3  - Lipid panel reflex to direct LDL Fasting  - Comprehensive metabolic panel    Screen for colon cancer  Did fit for now    Screening for prostate cancer  - Prostate Specific Antigen Screen    Screening, deficiency anemia, iron  - CBC with platelets      End of Life Planning:  Patient currently has an advanced directive: No.  I have verified the patient's ablity to prepare an advanced directive/make health care decisions.  Literature was provided to assist patient in preparing an advanced directive.    COUNSELING:  Reviewed preventive health counseling, as reflected in patient instructions    Estimated body mass index is 36.62 kg/m  as calculated from the following:    Height as of this encounter: 1.753 m (5' 9\").    Weight as of this encounter: 112.5 kg (248 lb).  Weight " management plan: Discussed healthy diet and exercise guidelines     reports that he has never smoked. He has never used smokeless tobacco.      Appropriate preventive services were discussed with this patient, including applicable screening as appropriate for cardiovascular disease, diabetes, osteopenia/osteoporosis, and glaucoma.  As appropriate for age/gender, discussed screening for colorectal cancer, prostate cancer, breast cancer, and cervical cancer. Checklist reviewing preventive services available has been given to the patient.    Reviewed patients plan of care and provided an AVS. The Basic Care Plan (routine screening as documented in Health Maintenance) for Hector meets the Care Plan requirement. This Care Plan has been established and reviewed with the Patient.    Return in about 53 weeks (around 12/13/2022) for Annual Wellness Visit.           Bubba Marsh MD     [address] [address]  [employer]     Office: [phone]       Identified Health Risks:    The patient was counseled and encouraged to consider modifying their diet and eating habits. He was provided with information on recommended healthy diet options.  The patient was provided with written information regarding signs of hearing loss.

## 2021-12-07 NOTE — PATIENT INSTRUCTIONS
Patient Education   Preventive Health Recommendations  See your health care provider every year to    Review health changes.     Discuss preventive care.      Review your medicines if your doctor has prescribed any.    Talk with your health care provider about whether you should have a test to screen for prostate cancer (PSA).    Every 3 years, have a diabetes test (fasting glucose). If you are at risk for diabetes, you should have this test more often.    Every 5 years, have a cholesterol test. Have this test more often if you are at risk for high cholesterol or heart disease.     Every 10 years, have a colonoscopy. Or, have a yearly FIT test (stool test). These exams will check for colon cancer.    Talk to with your health care provider about screening for Abdominal Aortic Aneurysm if you have a family history of AAA or have a history of smoking.    Shots:     Get a flu shot each year.     Get a tetanus shot every 10 years.     Talk to your doctor about your pneumonia vaccines. There are now two you should receive - Pneumovax (PPSV 23) and Prevnar (PCV 13).    Talk to your pharmacist about a shingles vaccine.     Talk to your doctor about the hepatitis B vaccine.    Nutrition:     Eat at least 5 servings of fruits and vegetables each day.     Eat whole-grain bread, whole-wheat pasta and brown rice instead of white grains and rice.     Get adequate Calcium and Vitamin D.     Lifestyle    Exercise for at least 150 minutes a week (30 minutes a day, 5 days a week). This will help you control your weight and prevent disease.     Limit alcohol to one drink per day.     No smoking.     Wear sunscreen to prevent skin cancer.     See your dentist every six months for an exam and cleaning.     See your eye doctor every 1 to 2 years to screen for conditions such as glaucoma, macular degeneration and cataracts.    Personalized Prevention Plan  You are due for the preventive services outlined below.  Your care team is  available to assist you in scheduling these services.  If you have already completed any of these items, please share that information with your care team to update in your medical record.  Health Maintenance   Topic Date Due     ZOSTER IMMUNIZATION (1 of 2) Never done     PHQ-2  01/01/2021     FALL RISK ASSESSMENT  Never done     Pneumococcal Vaccine: 65+ Years (1 of 1 - PPSV23) Never done     INFLUENZA VACCINE (1) 09/01/2021     BMP  12/03/2021     LIPID  12/03/2021     MICROALBUMIN  12/03/2021     COLORECTAL CANCER SCREENING  11/03/2021     PSA  12/03/2021     ANNUAL REVIEW OF HM ORDERS  09/07/2022     MEDICARE ANNUAL WELLNESS VISIT  12/07/2022     DTAP/TDAP/TD IMMUNIZATION (2 - Td or Tdap) 01/12/2025     ADVANCE CARE PLANNING  12/07/2026     HEPATITIS C SCREENING  Completed     HIV SCREENING  Completed     AORTIC ANEURYSM SCREENING (SYSTEM ASSIGNED)  Completed     COVID-19 Vaccine  Completed     Pneumococcal Vaccine: Pediatrics (0 to 5 Years) and At-Risk Patients (6 to 64 Years)  Aged Out     IPV IMMUNIZATION  Aged Out     MENINGITIS IMMUNIZATION  Aged Out     HEPATITIS B IMMUNIZATION  Aged Out       Understanding USDA MyPlate  The USDA has guidelines to help you make healthy food choices. These are called MyPlate. MyPlate shows the food groups that make up healthy meals using the image of a place setting. Before you eat, think about the healthiest choices for what to put on your plate or in your cup or bowl. To learn more about building a healthy plate, visit www.choosemyplate.gov.    The food groups    Fruits. Any fruit or 100% fruit juice counts as part of the Fruit Group. Fruits may be fresh, canned, frozen, or dried, and may be whole, cut-up, or pureed. Make 1/2 of your plate fruits and vegetables.    Vegetables. Any vegetable or 100% vegetable juice counts as a member of the Vegetable Group. Vegetables may be fresh, frozen, canned, or dried. They can be served raw or cooked and may be whole, cut-up, or  mashed. Make 1/2 of your plate fruits and vegetables.    Grains. All foods made from grains are part of the Grains Group. These include wheat, rice, oats, cornmeal, and barley. Grains are often used to make foods such as bread, pasta, oatmeal, cereal, tortillas, and grits. Grains should be no more than 1/4 of your plate. At least half of your grains should be whole grains.    Protein. This group includes meat, poultry, seafood, beans and peas, eggs, processed soy products (such as tofu), nuts (including nut butters), and seeds. Make protein choices no more than 1/4 of your plate. Meat and poultry choices should be lean or low fat.    Dairy. The Dairy Group includes all fluid milk products and foods made from milk that contain calcium, such as yogurt and cheese. (Foods that have little calcium, such as cream, butter, and cream cheese, are not part of this group.) Most dairy choices should be low-fat or fat-free.    Oils. Oils aren't a food group, but they do contain essential nutrients. However it's important to watch your intake of oils. These are fats that are liquid at room temperature. They include canola, corn, olive, soybean, vegetable, and sunflower oil. Foods that are mainly oil include mayonnaise, certain salad dressings, and soft margarines. You likely already get your daily oil allowance from the foods you eat.  Things to limit  Eating healthy also means limiting these things in your diet:       Salt (sodium). Many processed foods have a lot of sodium. To keep sodium intake down, eat fresh vegetables, meats, poultry, and seafood when possible. Purchase low-sodium, reduced-sodium, or no-salt-added food products at the store. And don't add salt to your meals at home. Instead, season them with herbs and spices such as dill, oregano, cumin, and paprika. Or try adding flavor with lemon or lime zest and juice.    Saturated fat. Saturated fats are most often found in animal products such as beef, pork, and  chicken. They are often solid at room temperature, such as butter. To reduce your saturated fat intake, choose leaner cuts of meat and poultry. And try healthier cooking methods such as grilling, broiling, roasting, or baking. For a simple lower-fat swap, use plain nonfat yogurt instead of mayonnaise when making potato salad or macaroni salad.    Added sugars. These are sugars added to foods. They are in foods such as ice cream, candy, soda, fruit drinks, sports drinks, energy drinks, cookies, pastries, jams, and syrups. Cut down on added sugars by sharing sweet treats with a family member or friend. You can also choose fruit for dessert, and drink water or other unsweetened beverages.     "[employer] last reviewed this educational content on 6/1/2020 2000-2021 [employer]. All rights reserved. This information is not intended as a substitute for professional medical care. Always follow your healthcare professional's instructions.          Signs of Hearing Loss      Hearing much better with one ear can be a sign of hearing loss.   Hearing loss is a problem shared by many people. In fact, it is one of the most common health problems, particularly as people age. Most people age 65 and older have some hearing loss. By age 80, almost everyone does. Hearing loss often occurs slowly over the years. So you may not realize your hearing has gotten worse.  Have your hearing checked  Call your healthcare provider if you:    Have to strain to hear normal conversation    Have to watch other people s faces very carefully to follow what they re saying    Need to ask people to repeat what they ve said    Often misunderstand what people are saying    Turn the volume of the television or radio up so high that others complain    Feel that people are mumbling when they re talking to you    Find that the effort to hear leaves you feeling tired and irritated    Notice, when using the phone, that you hear better with one ear  than the other  [employer] last reviewed this educational content on 1/1/2020 2000-2021 [employer]. All rights reserved. This information is not intended as a substitute for professional medical care. Always follow your healthcare professional's instructions.

## 2021-12-08 LAB
ALBUMIN SERPL-MCNC: 3.4 G/DL (ref 3.4–5)
ALP SERPL-CCNC: 79 U/L (ref 40–150)
ALT SERPL W P-5'-P-CCNC: 28 U/L (ref 0–70)
ANION GAP SERPL CALCULATED.3IONS-SCNC: 5 MMOL/L (ref 3–14)
AST SERPL W P-5'-P-CCNC: 18 U/L (ref 0–45)
BILIRUB SERPL-MCNC: 0.7 MG/DL (ref 0.2–1.3)
BUN SERPL-MCNC: 25 MG/DL (ref 7–30)
CALCIUM SERPL-MCNC: 8.4 MG/DL (ref 8.5–10.1)
CHLORIDE BLD-SCNC: 106 MMOL/L (ref 94–109)
CO2 SERPL-SCNC: 26 MMOL/L (ref 20–32)
CREAT SERPL-MCNC: 1.23 MG/DL (ref 0.66–1.25)
CREAT UR-MCNC: 54 MG/DL
GFR SERPL CREATININE-BSD FRML MDRD: 61 ML/MIN/1.73M2
GLUCOSE BLD-MCNC: 100 MG/DL (ref 70–99)
MICROALBUMIN UR-MCNC: <5 MG/L
MICROALBUMIN/CREAT UR: NORMAL MG/G{CREAT}
POTASSIUM BLD-SCNC: 3.8 MMOL/L (ref 3.4–5.3)
PROT SERPL-MCNC: 6.9 G/DL (ref 6.8–8.8)
PSA SERPL-MCNC: 0.93 UG/L (ref 0–4)
SODIUM SERPL-SCNC: 137 MMOL/L (ref 133–144)

## 2021-12-08 ASSESSMENT — PATIENT HEALTH QUESTIONNAIRE - PHQ9: SUM OF ALL RESPONSES TO PHQ QUESTIONS 1-9: 0

## 2021-12-08 ASSESSMENT — ANXIETY QUESTIONNAIRES: GAD7 TOTAL SCORE: 0

## 2021-12-15 PROBLEM — M25.579 PAIN IN JOINT, ANKLE AND FOOT, UNSPECIFIED LATERALITY: Status: RESOLVED | Noted: 2021-09-24 | Resolved: 2021-12-15

## 2021-12-15 NOTE — PROGRESS NOTES
"DISCHARGE REPORT    Progress reporting period is from 9-24-21 to 10-26-21.       SUBJECTIVE  Subjective changes noted by patient:  .  Subjective: Had a few stressful weeks due to personal and family issues/illness. Did not do any exercises in the past few weeks--did take along walk yesterday and felt he had less pain and pain went away quicker.     Current pain level is 1/10  .     Previous pain level was  1/10 Initial Pain level: 1/10.   Changes in function:  Yes (See Goal flowsheet attached for changes in current functional level)  Adverse reaction to treatment or activity: None    OBJECTIVE  Changes noted in objective findings:  Patient has failed to return to therapy so current objective findings are unknown.  Objective: no objective changes; after Gastroc stretching felt \"tighter\" vs chair stretch when he felt \"lighter\"; post press ups noted less \"tightness\" with amublation     ASSESSMENT/PLAN  Updated problem list and treatment plan: Diagnosis 1:  Bilateral foot pain  Pain -  self management, education, directional preference exercise and home program  Decreased ROM/flexibility - manual therapy and therapeutic exercise  Decreased joint mobility - manual therapy and therapeutic exercise  Decreased strength - therapeutic exercise and therapeutic activities  Decreased function - therapeutic activities  STG/LTGs have been met or progress has been made towards goals:  Yes (See Goal flow sheet completed today.)  Assessment of Progress: The patient has not returned to therapy. Current status is unknown.  Self Management Plans:  Patient is independent in a home treatment program.  Patient is independent in self management of symptoms.  I have re-evaluated this patient and find that the nature, scope, duration and intensity of the therapy is appropriate for the medical condition of the patient.  Hector continues to require the following intervention to meet STG and LTG's:  PT intervention is no longer required to meet " STG/LTG.    Recommendations:  This patient is ready to be discharged from therapy and continue their home treatment program.    Please refer to the daily flowsheet for treatment today, total treatment time and time spent performing 1:1 timed codes.

## 2021-12-16 LAB
CHOLEST SERPL-MCNC: 127 MG/DL
FASTING STATUS PATIENT QL REPORTED: YES
HDLC SERPL-MCNC: 27 MG/DL
LDLC SERPL CALC-MCNC: 76 MG/DL
NONHDLC SERPL-MCNC: 100 MG/DL
TRIGL SERPL-MCNC: 122 MG/DL

## 2021-12-23 NOTE — RESULT ENCOUNTER NOTE
Dear Nathan,    Here is a summary of your recent test results:  -Normal red blood cell (hgb) levels, normal white blood cell count and normal platelet levels.  -PSA (prostate specific antigen) test is normal.  This indicates a low likelihood of prostate cancer.  ADVISE: rechecking this in 1 year.  -Cholesterol levels are at your goal levels.  ADVISE: continuing your medication, a regular exercise program with at least 150 minutes of aerobic exercise per week, and eating a low saturated fat/low carbohydrate diet.  Also, you should recheck this fasting cholesterol panel in 12 months.  -Liver and gallbladder tests (ALT,AST, Alk phos,bilirubin) are normal.  -Kidney function (GFR) is normal.  -Sodium is normal.  -Potassium is normal.  -Calcium is normal.  -Glucose is slight elevated and may be a sign of early diabetes (prediabetes). ADVISE:: eating a low carbohydrate diet, exercising, trying to lose weight (if necessary) and rechecking your glucose level in 12 months.  -Microalbumin (urine protein) test is normal.  ADVISE: rechecking this annually.    For additional lab test information, labtestsonline.org is an excellent reference.    In addition, here is a list of due or overdue Health Maintenance reminders:    Colorectal Cancer Screening due on 11/03/2021    Please call us at [phone] (or use [employer]) to address the above recommendations if needed.           Thank you very much for trusting me and [facility].     Have a peaceful day.    Healthy regards,  Bubba Marsh MD

## 2022-09-11 ENCOUNTER — HEALTH MAINTENANCE LETTER (OUTPATIENT)
Age: 67
End: 2022-09-11

## 2023-01-22 ENCOUNTER — HEALTH MAINTENANCE LETTER (OUTPATIENT)
Age: 68
End: 2023-01-22

## 2024-02-18 ENCOUNTER — HEALTH MAINTENANCE LETTER (OUTPATIENT)
Age: 69
End: 2024-02-18